# Patient Record
Sex: FEMALE | Race: WHITE | NOT HISPANIC OR LATINO | URBAN - METROPOLITAN AREA
[De-identification: names, ages, dates, MRNs, and addresses within clinical notes are randomized per-mention and may not be internally consistent; named-entity substitution may affect disease eponyms.]

---

## 2017-02-10 ENCOUNTER — EMERGENCY (EMERGENCY)
Facility: HOSPITAL | Age: 67
LOS: 0 days | Discharge: HOME | End: 2017-02-10

## 2017-06-27 DIAGNOSIS — M25.551 PAIN IN RIGHT HIP: ICD-10-CM

## 2017-06-27 DIAGNOSIS — Z88.0 ALLERGY STATUS TO PENICILLIN: ICD-10-CM

## 2017-07-16 ENCOUNTER — EMERGENCY (EMERGENCY)
Facility: HOSPITAL | Age: 67
LOS: 0 days | Discharge: HOME | End: 2017-07-16

## 2017-07-16 DIAGNOSIS — K08.89 OTHER SPECIFIED DISORDERS OF TEETH AND SUPPORTING STRUCTURES: ICD-10-CM

## 2017-07-16 DIAGNOSIS — K04.7 PERIAPICAL ABSCESS WITHOUT SINUS: ICD-10-CM

## 2017-07-16 DIAGNOSIS — Z88.0 ALLERGY STATUS TO PENICILLIN: ICD-10-CM

## 2017-07-16 DIAGNOSIS — Z79.899 OTHER LONG TERM (CURRENT) DRUG THERAPY: ICD-10-CM

## 2018-06-22 ENCOUNTER — EMERGENCY (EMERGENCY)
Facility: HOSPITAL | Age: 68
LOS: 0 days | Discharge: HOME | End: 2018-06-22
Attending: EMERGENCY MEDICINE | Admitting: EMERGENCY MEDICINE
Payer: MEDICARE

## 2018-06-22 VITALS
HEIGHT: 62 IN | DIASTOLIC BLOOD PRESSURE: 98 MMHG | TEMPERATURE: 98 F | OXYGEN SATURATION: 99 % | HEART RATE: 82 BPM | WEIGHT: 149.03 LBS | RESPIRATION RATE: 18 BRPM | SYSTOLIC BLOOD PRESSURE: 155 MMHG

## 2018-06-22 DIAGNOSIS — M79.89 OTHER SPECIFIED SOFT TISSUE DISORDERS: ICD-10-CM

## 2018-06-22 DIAGNOSIS — M71.21 SYNOVIAL CYST OF POPLITEAL SPACE [BAKER], RIGHT KNEE: ICD-10-CM

## 2018-06-22 PROCEDURE — 93970 EXTREMITY STUDY: CPT | Mod: 26

## 2018-06-22 NOTE — ED PROVIDER NOTE - ATTENDING CONTRIBUTION TO CARE
66 yo F presents with c/o swelling to right leg and ankle noted today while getting a pedicure.  Pt has recently started to go to kick boxing class and was hitting the bag with her knee, no SOB, no travel.  On exam pt in NAD AAo x 3, appears well, seen ambulate with staedy gait, + mild swelling to RLE with some calf tenderness, good ROM, no skin changes, + DP pulses equal

## 2018-06-22 NOTE — ED PROVIDER NOTE - OBJECTIVE STATEMENT
right ankle swelling, Started over past week and working out kick boxing, ,Mild pain, No chest pain, no SOB, no back or abdominal pain

## 2018-06-22 NOTE — ED PROVIDER NOTE - MUSCULOSKELETAL MINIMAL EXAM
mild swelling to right ankle with tenderness over right calf, NV intact, FROM, able to wt beat without difficulty

## 2018-06-22 NOTE — ED PROVIDER NOTE - MEDICAL DECISION MAKING DETAILS
Results reviewed and d/w patient and family.  Pt will follow up with PMD, Rec elevate, rest, return if worse

## 2018-06-22 NOTE — ED ADULT TRIAGE NOTE - CHIEF COMPLAINT QUOTE
"I have right leg and ankle pain and swelling." Pt states she started an exercise class two weeks ago.

## 2020-03-04 ENCOUNTER — OUTPATIENT (OUTPATIENT)
Dept: OUTPATIENT SERVICES | Facility: HOSPITAL | Age: 70
LOS: 1 days | Discharge: HOME | End: 2020-03-04

## 2020-03-04 VITALS
HEART RATE: 74 BPM | RESPIRATION RATE: 18 BRPM | WEIGHT: 149.91 LBS | TEMPERATURE: 98 F | HEIGHT: 62 IN | DIASTOLIC BLOOD PRESSURE: 79 MMHG | OXYGEN SATURATION: 98 % | SYSTOLIC BLOOD PRESSURE: 149 MMHG

## 2020-03-04 VITALS — DIASTOLIC BLOOD PRESSURE: 83 MMHG | SYSTOLIC BLOOD PRESSURE: 129 MMHG | HEART RATE: 71 BPM | RESPIRATION RATE: 16 BRPM

## 2020-03-04 NOTE — PRE-ANESTHESIA EVALUATION ADULT - NSANTHOSAYNRD_GEN_A_CORE
No. ABELINO screening performed.  STOP BANG Legend: 0-2 = LOW Risk; 3-4 = INTERMEDIATE Risk; 5-8 = HIGH Risk

## 2020-03-13 DIAGNOSIS — H26.9 UNSPECIFIED CATARACT: ICD-10-CM

## 2020-03-13 DIAGNOSIS — H52.4 PRESBYOPIA: ICD-10-CM

## 2020-03-13 DIAGNOSIS — H52.202 UNSPECIFIED ASTIGMATISM, LEFT EYE: ICD-10-CM

## 2020-03-13 DIAGNOSIS — Z88.0 ALLERGY STATUS TO PENICILLIN: ICD-10-CM

## 2021-01-19 ENCOUNTER — TRANSCRIPTION ENCOUNTER (OUTPATIENT)
Age: 71
End: 2021-01-19

## 2021-02-16 ENCOUNTER — TRANSCRIPTION ENCOUNTER (OUTPATIENT)
Age: 71
End: 2021-02-16

## 2021-03-23 ENCOUNTER — APPOINTMENT (OUTPATIENT)
Dept: DERMATOLOGY | Facility: CLINIC | Age: 71
End: 2021-03-23

## 2021-09-21 ENCOUNTER — TRANSCRIPTION ENCOUNTER (OUTPATIENT)
Age: 71
End: 2021-09-21

## 2021-10-09 ENCOUNTER — FORM ENCOUNTER (OUTPATIENT)
Age: 71
End: 2021-10-09

## 2021-10-10 ENCOUNTER — TRANSCRIPTION ENCOUNTER (OUTPATIENT)
Age: 71
End: 2021-10-10

## 2021-10-10 PROBLEM — Z00.00 ENCOUNTER FOR PREVENTIVE HEALTH EXAMINATION: Status: ACTIVE | Noted: 2021-10-10

## 2021-10-12 ENCOUNTER — APPOINTMENT (OUTPATIENT)
Age: 71
End: 2021-10-12

## 2021-10-12 ENCOUNTER — TRANSCRIPTION ENCOUNTER (OUTPATIENT)
Age: 71
End: 2021-10-12

## 2022-03-11 NOTE — PACU DISCHARGE NOTE - MENTAL STATUS: PATIENT PARTICIPATION
Pending Prescriptions:                       Disp   Refills    ALPRAZolam (XANAX) 0.5 MG tablet           60 tab*0        Sig: Take 1 tablet (0.5 mg) by mouth 2 times daily as           needed for anxiety    Routing refill request to provider for review/approval because:  Drug not on the G refill protocol     Renay Jackson RN         Awake

## 2022-04-26 NOTE — ASU PATIENT PROFILE, ADULT - TRANSFUSION PREMEDICATION REQUIRED
none Methotrexate Counseling:  Patient counseled regarding adverse effects of methotrexate including but not limited to nausea, vomiting, abnormalities in liver function tests. Patients may develop mouth sores, rash, diarrhea, and abnormalities in blood counts. The patient understands that monitoring is required including LFT's and blood counts.  There is a rare possibility of scarring of the liver and lung problems that can occur when taking methotrexate. Persistent nausea, loss of appetite, pale stools, dark urine, cough, and shortness of breath should be reported immediately. Patient advised to discontinue methotrexate treatment at least three months before attempting to become pregnant.  I discussed the need for folate supplements while taking methotrexate.  These supplements can decrease side effects during methotrexate treatment. The patient verbalized understanding of the proper use and possible adverse effects of methotrexate.  All of the patient's questions and concerns were addressed.

## 2022-11-03 NOTE — ED PROVIDER NOTE - RESPIRATORY NEGATIVE STATEMENT, MLM
Chief Complaint       Cough (Symptoms started on Tuesday. She had one dose of mucinex. ), Nasal Congestion, Pharyngitis, and Laryngitis    History of Present Illness   Source of history provided by:  patient. Zita Acosta is a 49 y.o. old female presenting to the walk in clinic for evaluation of nasal congestion, nasal drainage, bilateral mild ear pressure, mild non-productive cough and sore throat x 4 days. Has been taking mucinex DM OTC without relief. Denies any fever, chills, wheezing, CP, SOB, or GI symptoms. Patient denies any hx of asthma or COPD. Pt is former smoker. Patient denies any contact with any individuals with known COVID-19 infection or under investigation for COVID-19 infection. Pt has been vaccinated for COVID-19. Pt was seen by PCP on 11/1 for symptoms. ROS    Unless otherwise stated in this report or unable to obtain because of the patient's clinical or mental status as evidenced by the medical record, this patients's positive and negative responses for Review of Systems, constitutional, psych, eyes, ENT, cardiovascular, respiratory, gastrointestinal, neurological, genitourinary, musculoskeletal, integument systems and systems related to the presenting problem are either stated in the preceding or were not pertinent or were negative for the symptoms and/or complaints related to the medical problem. Physical Exam         VS:  /80   Pulse 92   Temp 97.9 °F (36.6 °C) (Temporal)   Resp 16   Ht 5' 4\" (1.626 m)   Wt 241 lb (109.3 kg)   LMP 11/03/2022 (Exact Date)   SpO2 99%   BMI 41.37 kg/m²    Oxygen Saturation Interpretation: Normal.    Constitutional:  Alert, development consistent with age. Ears:  External Ears: Bilateral pinna normal. TMs translucent without erythema or perforation bilaterally. Canals normal bilaterally without swelling or exudate  Nose:  Mild congestion of the nasal mucosa. There is no injection to middle turbinates bilaterally.    Throat: Mild posterior pharyngeal erythema with mild post nasal drip present. No exudate or tonsillar hypertrophy noted. Neck:  Supple. There is no anterior cervical adenopathy. Lungs: CTAB without wheezes, rales, or rhonchi  Heart:  Regular rate and rhythm, normal heart sounds, without pathological murmurs, ectopy, gallops, or rubs. Skin:  Normal turgor. Warm, dry, without visible rash. Neurological:  Alert and oriented. Motor functions intact. Responds to verbal commands. Lab / Imaging Results   (All laboratory and radiology results have been personally reviewed by myself)  Labs:  Results for orders placed or performed in visit on 11/03/22   POCT COVID-19, Antigen   Result Value Ref Range    SARS-COV-2, POC Not-Detected Not Detected    Lot Number [id number]     QC Pass/Fail pass     Performing Instrument BD Veritor    POCT rapid strep A   Result Value Ref Range    Strep A Ag None Detected None Detected       Imaging: All Radiology results interpreted by Radiologist unless otherwise noted. Assessment / Plan     Impression(s):  Nicolle Grewal was seen today for cough, nasal congestion, pharyngitis and laryngitis. Diagnoses and all orders for this visit:    URI with cough and congestion  -     POCT COVID-19, Antigen  -     POCT rapid strep A  -     methylPREDNISolone (MEDROL DOSEPACK) 4 MG tablet; Take by mouth.  -     azithromycin (ZITHROMAX) 250 MG tablet; Take 2 tablets by mouth on day 1, Take 1 tablet by mouth on days 2-5    Disposition:  Disposition: Discharge to home. Increase fluids and rest. Script written for dose pack and azithromycin, side effects discussed. Additional symptomatic relief discussed. PCP in 5-7 days if symptoms persist. ED sooner if symptoms worsen or change. Red flag symptoms discussed. Pt is in agreement with this care plan. All questions answered. CHRISTIE Velasco    **This report was transcribed using voice recognition software.  Every effort was made to ensure accuracy; however, inadvertent computerized transcription errors may be present. no chest pain, no cough, and no shortness of breath.

## 2022-11-26 ENCOUNTER — INPATIENT (INPATIENT)
Facility: HOSPITAL | Age: 72
LOS: 2 days | Discharge: HOME HEALTH | End: 2022-11-29
Attending: STUDENT IN AN ORGANIZED HEALTH CARE EDUCATION/TRAINING PROGRAM | Admitting: STUDENT IN AN ORGANIZED HEALTH CARE EDUCATION/TRAINING PROGRAM

## 2022-11-26 VITALS
OXYGEN SATURATION: 100 % | TEMPERATURE: 100 F | HEART RATE: 108 BPM | SYSTOLIC BLOOD PRESSURE: 136 MMHG | WEIGHT: 139.99 LBS | RESPIRATION RATE: 18 BRPM | DIASTOLIC BLOOD PRESSURE: 83 MMHG

## 2022-11-26 LAB
A1C WITH ESTIMATED AVERAGE GLUCOSE RESULT: 5.3 % — SIGNIFICANT CHANGE UP (ref 4–5.6)
ALBUMIN SERPL ELPH-MCNC: 4.1 G/DL — SIGNIFICANT CHANGE UP (ref 3.5–5.2)
ALP SERPL-CCNC: 88 U/L — SIGNIFICANT CHANGE UP (ref 30–115)
ALT FLD-CCNC: 45 U/L — HIGH (ref 0–41)
ANION GAP SERPL CALC-SCNC: 10 MMOL/L — SIGNIFICANT CHANGE UP (ref 7–14)
AST SERPL-CCNC: 37 U/L — SIGNIFICANT CHANGE UP (ref 0–41)
BASOPHILS # BLD AUTO: 0.03 K/UL — SIGNIFICANT CHANGE UP (ref 0–0.2)
BASOPHILS NFR BLD AUTO: 0.4 % — SIGNIFICANT CHANGE UP (ref 0–1)
BILIRUB SERPL-MCNC: 0.6 MG/DL — SIGNIFICANT CHANGE UP (ref 0.2–1.2)
BUN SERPL-MCNC: 12 MG/DL — SIGNIFICANT CHANGE UP (ref 10–20)
CALCIUM SERPL-MCNC: 9.2 MG/DL — SIGNIFICANT CHANGE UP (ref 8.4–10.5)
CHLORIDE SERPL-SCNC: 102 MMOL/L — SIGNIFICANT CHANGE UP (ref 98–110)
CHOLEST SERPL-MCNC: 140 MG/DL — SIGNIFICANT CHANGE UP
CO2 SERPL-SCNC: 26 MMOL/L — SIGNIFICANT CHANGE UP (ref 17–32)
CREAT SERPL-MCNC: 0.6 MG/DL — LOW (ref 0.7–1.5)
CRP SERPL-MCNC: 194.7 MG/L — HIGH
D DIMER BLD IA.RAPID-MCNC: 386 NG/ML DDU — HIGH (ref 0–230)
EGFR: 95 ML/MIN/1.73M2 — SIGNIFICANT CHANGE UP
EOSINOPHIL # BLD AUTO: 0.04 K/UL — SIGNIFICANT CHANGE UP (ref 0–0.7)
EOSINOPHIL NFR BLD AUTO: 0.6 % — SIGNIFICANT CHANGE UP (ref 0–8)
ERYTHROCYTE [SEDIMENTATION RATE] IN BLOOD: 90 MM/HR — HIGH (ref 0–20)
ESTIMATED AVERAGE GLUCOSE: 105 MG/DL — SIGNIFICANT CHANGE UP (ref 68–114)
FLUAV AG NPH QL: SIGNIFICANT CHANGE UP
FLUBV AG NPH QL: SIGNIFICANT CHANGE UP
GLUCOSE SERPL-MCNC: 119 MG/DL — HIGH (ref 70–99)
HCT VFR BLD CALC: 38.6 % — SIGNIFICANT CHANGE UP (ref 37–47)
HDLC SERPL-MCNC: 54 MG/DL — SIGNIFICANT CHANGE UP
HGB BLD-MCNC: 13 G/DL — SIGNIFICANT CHANGE UP (ref 12–16)
IMM GRANULOCYTES NFR BLD AUTO: 0.3 % — SIGNIFICANT CHANGE UP (ref 0.1–0.3)
LIPID PNL WITH DIRECT LDL SERPL: 71 MG/DL — SIGNIFICANT CHANGE UP
LYMPHOCYTES # BLD AUTO: 0.81 K/UL — LOW (ref 1.2–3.4)
LYMPHOCYTES # BLD AUTO: 11.3 % — LOW (ref 20.5–51.1)
MAGNESIUM SERPL-MCNC: 2 MG/DL — SIGNIFICANT CHANGE UP (ref 1.8–2.4)
MCHC RBC-ENTMCNC: 28.8 PG — SIGNIFICANT CHANGE UP (ref 27–31)
MCHC RBC-ENTMCNC: 33.7 G/DL — SIGNIFICANT CHANGE UP (ref 32–37)
MCV RBC AUTO: 85.6 FL — SIGNIFICANT CHANGE UP (ref 81–99)
MONOCYTES # BLD AUTO: 0.81 K/UL — HIGH (ref 0.1–0.6)
MONOCYTES NFR BLD AUTO: 11.3 % — HIGH (ref 1.7–9.3)
NEUTROPHILS # BLD AUTO: 5.43 K/UL — SIGNIFICANT CHANGE UP (ref 1.4–6.5)
NEUTROPHILS NFR BLD AUTO: 76.1 % — HIGH (ref 42.2–75.2)
NON HDL CHOLESTEROL: 86 MG/DL — SIGNIFICANT CHANGE UP
NRBC # BLD: 0 /100 WBCS — SIGNIFICANT CHANGE UP (ref 0–0)
NT-PROBNP SERPL-SCNC: 882 PG/ML — HIGH (ref 0–300)
PLATELET # BLD AUTO: 219 K/UL — SIGNIFICANT CHANGE UP (ref 130–400)
POTASSIUM SERPL-MCNC: 3.9 MMOL/L — SIGNIFICANT CHANGE UP (ref 3.5–5)
POTASSIUM SERPL-SCNC: 3.9 MMOL/L — SIGNIFICANT CHANGE UP (ref 3.5–5)
PROT SERPL-MCNC: 6.7 G/DL — SIGNIFICANT CHANGE UP (ref 6–8)
RBC # BLD: 4.51 M/UL — SIGNIFICANT CHANGE UP (ref 4.2–5.4)
RBC # FLD: 12.4 % — SIGNIFICANT CHANGE UP (ref 11.5–14.5)
RSV RNA NPH QL NAA+NON-PROBE: DETECTED
SARS-COV-2 RNA SPEC QL NAA+PROBE: SIGNIFICANT CHANGE UP
SODIUM SERPL-SCNC: 138 MMOL/L — SIGNIFICANT CHANGE UP (ref 135–146)
TRIGL SERPL-MCNC: 76 MG/DL — SIGNIFICANT CHANGE UP
TROPONIN T SERPL-MCNC: <0.01 NG/ML — SIGNIFICANT CHANGE UP
TROPONIN T SERPL-MCNC: <0.01 NG/ML — SIGNIFICANT CHANGE UP
TSH SERPL-MCNC: 0.68 UIU/ML — SIGNIFICANT CHANGE UP (ref 0.27–4.2)
WBC # BLD: 7.14 K/UL — SIGNIFICANT CHANGE UP (ref 4.8–10.8)
WBC # FLD AUTO: 7.14 K/UL — SIGNIFICANT CHANGE UP (ref 4.8–10.8)

## 2022-11-26 PROCEDURE — 93010 ELECTROCARDIOGRAM REPORT: CPT | Mod: 77

## 2022-11-26 PROCEDURE — 93306 TTE W/DOPPLER COMPLETE: CPT | Mod: 26

## 2022-11-26 PROCEDURE — 71275 CT ANGIOGRAPHY CHEST: CPT | Mod: 26

## 2022-11-26 PROCEDURE — 93010 ELECTROCARDIOGRAM REPORT: CPT | Mod: 77,76

## 2022-11-26 PROCEDURE — 99222 1ST HOSP IP/OBS MODERATE 55: CPT

## 2022-11-26 PROCEDURE — 99223 1ST HOSP IP/OBS HIGH 75: CPT

## 2022-11-26 PROCEDURE — 99285 EMERGENCY DEPT VISIT HI MDM: CPT | Mod: FS

## 2022-11-26 PROCEDURE — 93970 EXTREMITY STUDY: CPT | Mod: 26

## 2022-11-26 PROCEDURE — 71045 X-RAY EXAM CHEST 1 VIEW: CPT | Mod: 26

## 2022-11-26 RX ORDER — COLCHICINE 0.6 MG
0.6 TABLET ORAL ONCE
Refills: 0 | Status: COMPLETED | OUTPATIENT
Start: 2022-11-26 | End: 2022-11-26

## 2022-11-26 RX ORDER — METOPROLOL TARTRATE 50 MG
25 TABLET ORAL EVERY 8 HOURS
Refills: 0 | Status: DISCONTINUED | OUTPATIENT
Start: 2022-11-26 | End: 2022-11-27

## 2022-11-26 RX ORDER — ACETAMINOPHEN 500 MG
650 TABLET ORAL EVERY 6 HOURS
Refills: 0 | Status: DISCONTINUED | OUTPATIENT
Start: 2022-11-26 | End: 2022-11-29

## 2022-11-26 RX ORDER — ENOXAPARIN SODIUM 100 MG/ML
40 INJECTION SUBCUTANEOUS EVERY 24 HOURS
Refills: 0 | Status: DISCONTINUED | OUTPATIENT
Start: 2022-11-26 | End: 2022-11-29

## 2022-11-26 RX ORDER — IBUPROFEN 200 MG
600 TABLET ORAL ONCE
Refills: 0 | Status: COMPLETED | OUTPATIENT
Start: 2022-11-26 | End: 2022-11-26

## 2022-11-26 RX ORDER — ASPIRIN/CALCIUM CARB/MAGNESIUM 324 MG
324 TABLET ORAL ONCE
Refills: 0 | Status: COMPLETED | OUTPATIENT
Start: 2022-11-26 | End: 2022-11-26

## 2022-11-26 RX ORDER — COLCHICINE 0.6 MG
0.6 TABLET ORAL DAILY
Refills: 0 | Status: DISCONTINUED | OUTPATIENT
Start: 2022-11-27 | End: 2022-11-27

## 2022-11-26 RX ORDER — IBUPROFEN 200 MG
600 TABLET ORAL DAILY
Refills: 0 | Status: DISCONTINUED | OUTPATIENT
Start: 2022-11-27 | End: 2022-11-27

## 2022-11-26 RX ORDER — ACETAMINOPHEN 500 MG
650 TABLET ORAL ONCE
Refills: 0 | Status: COMPLETED | OUTPATIENT
Start: 2022-11-26 | End: 2022-11-26

## 2022-11-26 RX ADMIN — Medication 600 MILLIGRAM(S): at 15:03

## 2022-11-26 RX ADMIN — Medication 650 MILLIGRAM(S): at 14:09

## 2022-11-26 RX ADMIN — Medication 650 MILLIGRAM(S): at 14:39

## 2022-11-26 RX ADMIN — ENOXAPARIN SODIUM 40 MILLIGRAM(S): 100 INJECTION SUBCUTANEOUS at 14:09

## 2022-11-26 RX ADMIN — Medication 0.6 MILLIGRAM(S): at 12:01

## 2022-11-26 RX ADMIN — Medication 650 MILLIGRAM(S): at 22:39

## 2022-11-26 RX ADMIN — Medication 25 MILLIGRAM(S): at 21:45

## 2022-11-26 RX ADMIN — Medication 0.6 MILLIGRAM(S): at 21:44

## 2022-11-26 RX ADMIN — Medication 324 MILLIGRAM(S): at 09:38

## 2022-11-26 RX ADMIN — Medication 600 MILLIGRAM(S): at 15:33

## 2022-11-26 RX ADMIN — Medication 650 MILLIGRAM(S): at 22:09

## 2022-11-26 RX ADMIN — Medication 25 MILLIGRAM(S): at 14:09

## 2022-11-26 NOTE — ED PROVIDER NOTE - NS ED ROS FT
Constitutional: (-) fever  Eyes/ENT: (-) blurry vision, (-) epistaxis  Cardiovascular: (+) chest pain, (-) syncope  Respiratory: (+) cough, (-) shortness of breath  Gastrointestinal: (-) vomiting, (-) diarrhea  Musculoskeletal: (-) neck pain, (-) back pain, (-) joint pain  Integumentary: (-) rash, (-) edema  Neurological: (-) headache, (-) altered mental status  Psychiatric: (-) hallucinations  Allergic/Immunologic: (-) pruritus

## 2022-11-26 NOTE — CHART NOTE - NSCHARTNOTEFT_GEN_A_CORE
I responded to code stemi.     72 yr old female presenting with chest pain since last night.   EKG: reviewed  Code STEMI Cancelled.   Patient will be transferred to [place] for further evaluation   Case discussed with attending. I responded to code stemi.  EKG: reviewed  Code STEMI Cancelled.   Patient will be transferred to [place] for further evaluation   Case discussed with attending.    Update:  Patient was seen and examined at bedside at [facility] with attending.     72 yr old female with no significant medical hx presenting with chest pain since last night. Pt states having cough for past 1 week associated with runny nose and one episode of 100.5 temp yesterday. Patinet started to have chest pain since last night, centrally placed radiating to the back, worse with deep breaths. Patient denies any cardiac hx, no prior chest pain.     # Acute post viral Pericarditis  # Suspected New onset Paroxsymal Afib   - hemodynamically stable  - EKG: NSR with no ischemia   - tele had brief period of afib --> then converted to NSR   - start colchicine 0.6 mg every 12 hours today --> then 0.6 mg once daily  - trend trop   - check echo   - check RVP/COVID  - check esr, crp  - check TSH  - tele monitoring   - admit to 4T I responded to code stemi.    EKG: reviewed  Code STEMI Cancelled.   Patient will be transferred to [place] for further evaluation   Case discussed with attending.    Update:  Patient was seen and examined at bedside at [facility] with attending.     72 yr old female with no significant medical hx presenting with chest pain since last night. Pt states having cough for past 1 week associated with runny nose and one episode of 100.5 temp yesterday. Patinet started to have chest pain since last night, centrally placed radiating to the back, worse with deep breaths. Patient denies any cardiac hx, no prior chest pain.     # Acute post viral Pericarditis  # Suspected New onset Paroxsymal Afib   - hemodynamically stable  - EKG: NSR with no ischemia   - tele had brief period of afib --> then converted to NSR   - start colchicine 0.6 mg every 12 hours today --> then 0.6 mg once daily  - trend trop   - check echo   - check RVP/COVID  - check esr, crp  - check TSH  - tele monitoring   - admit to 4T  - please refer to my consult note from today

## 2022-11-26 NOTE — CONSULT NOTE ADULT - ASSESSMENT
Patient seen and examined with the cardiology fellow. Discussed with the family at bedside.    71 y/o lady with no prior cardiac history, presenting with a few days of viral illness, low grade fevers, cough, now with pleuritic chest pain.  ECG was found to have ST elevations and a STEMI Code was called, which I have cancelled.    ECG and presentation is most consistent with acute pericarditis, likely viral.  F/u COVID-19, influenza and RSV screen. Respiratory panel.  Start Colchicine for 3 months and brief course of NSAID - indomethacin or ibuprofen for 7-10 days. H2 blocker fro GI protection.  2D echo to assess for pericardial effusion, r/o wall motion abnormalities.  Repeat troponin in 8 hours. Check ESR, CRP.  Cancel cath.  Possible episode of AF on tele - not recorded. Possibly due to pericardial irritation, monitor on telemetry for 24 hours. Would hold off on anticoagulation for now.  Admit to cardiac telemetry. Recall for any ischemic issues.

## 2022-11-26 NOTE — ED PROVIDER NOTE - PROGRESS NOTE DETAILS
Spoke ot Dr. Flores, [facility] cardiology fellow. EKG sent via teams. D/w Dr. Elliott aware of ER transfer Family appraised of situation. EMS at bedside. Aspirin given and chest x-ray done. CP: patient arrived from [facility]. stable. cardio fellow notified. obtaining repeat ekg CP: cardiology fellow and attending at bedside 72-year-old female presenting for chest pain.  Patient initially a STEMI however cardiology believes this to be myopericarditis would like patient admitted to cards telemetry for further evaluation and management no acute intervention at this time.

## 2022-11-26 NOTE — PATIENT PROFILE ADULT - FALL HARM RISK - HARM RISK INTERVENTIONS

## 2022-11-26 NOTE — ED ADULT NURSE REASSESSMENT NOTE - NS ED NURSE REASSESS COMMENT FT1
report given to ED Charge CANDIE Guerrero. as per MD Cruz patient ED to ED
Received pt from [facility]. Patient in critical care area of ED. Patient A&Ox4. Patient placed on tele o2 monitor. VSS. Cardiology at bedside. EKG done as per orders. Family at bedside.  Comfort measures in place. Will follow up.

## 2022-11-26 NOTE — ED PROVIDER NOTE - CHIEF COMPLAINT
The patient is a 72y Female complaining of cough. The patient is a 72y Female complaining of chest pain.

## 2022-11-26 NOTE — PATIENT PROFILE ADULT - IS PATIENT POST-MENOPAUSAL?
yes Ear Star Wedge Flap Text: The defect edges were debeveled with a #15 blade scalpel.  Given the location of the defect and the proximity to free margins (helical rim) an ear star wedge flap was deemed most appropriate.  Using a sterile surgical marker, the appropriate flap was drawn incorporating the defect and placing the expected incisions between the helical rim and antihelix where possible.  The area thus outlined was incised through and through with a #15 scalpel blade.

## 2022-11-26 NOTE — H&P ADULT - ASSESSMENT
Plan  72 yr old female with no significant medical hx presenting with chest pain  radiating to the back, since last night associated with recent viral illness with a temp of 100.5 with rhinorrhea and cough along with an episode of afib in the ED. Patient admitted to  for further management of pericarditis.     Assessment:   # Acute post viral Pericarditis  - hemodynamically stable  - EKG: NSR with no ischemia  - start colchicine 0.6 mg every 12 hours today --> then 0.6 mg once daily  - trend trop  - check echo  - check RVP/COVID  - check esr, crp  - check TSH, Free T4  - tele monitoring    # Suspected New onset Paroxsymal Afib  - tele had brief period of afib --> then converted to NSR  - Monitor on tele     Please contact me with any questions or concerns at [phone]. Plan  72 yr old female with no significant medical hx presenting with chest pain  radiating to the back, since last night associated with recent viral illness with a temp of 100.5 with rhinorrhea and cough along with an episode of afib in the ED. Patient admitted to  for further management of pericarditis.     Assessment:   # Acute post viral Pericarditis  - hemodynamically stable  - EKG: NSR with no ischemia  - start colchicine 0.6 mg every 12 hours today --> then 0.6 mg once daily  - trend trop  - check echo  - check RVP/COVID, blood cultures  - check esr, crp  - check TSH, Free T4  - tele monitoring    # Suspected New onset Paroxsymal Afib  - tele had brief period of afib --> then converted to NSR  - Monitor on tele     Please contact me with any questions or concerns at [phone].

## 2022-11-26 NOTE — H&P ADULT - NSHPLABSRESULTS_GEN_ALL_CORE
Data reviewed:  - Telemetry:    - ECG (date 11/26/22):    Ventricular Rate 106 BPM    Atrial Rate 106 BPM    P-R Interval 134 ms    QRS Duration 78 ms    Q-T Interval 344 ms    QTC Calculation(Bazett) 456 ms    P Axis 11 degrees    R Axis 109 degrees    T Axis 42 degrees    Diagnosis Line Sinus tachycardia  Rightward axis  Low voltage QRS  ST elevation, consider early repolarization, pericarditis, or injury  Abnormal ECG    Confirmed by Jase Ewing ([id number]) on 11/26/2022 11:37:49 AM    - Chest x-ray (date 11/26/22):  Impression:  No radiographic evidence of acute cardiopulmonary disease.   `  -No prior TTE, Stress test, CCTA, Cardiac catheterization, Cardiac MRI:    - Labs:                        13.0   7.14  )-----------( 219      ( 26 Nov 2022 08:37 )             38.6     11-26    138  |  102  |  12  ----------------------------<  119<H>  3.9   |  26  |  0.6<L>    Ca    9.2      26 Nov 2022 08:37  Mg     2.0     11-26    TPro  6.7  /  Alb  4.1  /  TBili  0.6  /  DBili  x   /  AST  37  /  ALT  45<H>  /  AlkPhos  88  11-26      Troponin T, Serum: <0.01 ng/mL (11-26-22 @ 08:37)    Serum Pro-Brain Natriuretic Peptide: 882 pg/mL (11-26)

## 2022-11-26 NOTE — ED PROVIDER NOTE - OBJECTIVE STATEMENT
72 year old female no sig past medical history comes to emergency room for chest pain. patient states that she has been sick for the last week with cough and ear pain and was seen by out patient urgent care center and told had infection and placed on zpak. patient states that since last night she is having chest tightness central located and going to left shoulder at times worse with deep breath. no shortness of breath. no leg pain or swelling. no fever/chills.

## 2022-11-26 NOTE — H&P ADULT - HISTORY OF PRESENT ILLNESS
Patient is a 72y old  female who presents with a chief complaint of intermittent midsternal chest pain radiating to the right scapula for 1 night while she was laying in bed last night. She states that the chest pain is worse when she lays down flat and takes a deep breath and alleviated when she sit forward. Patient endorses recent viral illness and had an episode of a temp of 100.5 yesterday with a runny nose and non productive cough. Patient denies taking any medications to alleviate the chest pain or the fever.     Patient was a code STEMI [facility] and was subsequently cancelled once repeat EKG was done showing diffuse ST elevation. Patient also had an episode of atrial fibrillation in the ED and reverted back to SR spontaneously. Tropx1 negative    No significant medical history or family history.  Patient is a 72y old  female who presents with a chief complaint of intermittent midsternal chest pain radiating to the right scapula for 1 night while she was laying in bed last night. She states that the chest pain is worse when she lays down flat and takes a deep breath and alleviated when she sit forward. Patient endorses recent viral illness and had an episode of a temp of 100.5 yesterday with a runny nose and non productive cough. Patient denies taking any medications to alleviate the chest pain or the fever.     Patient was a code STEMI [facility] and was subsequently cancelled once repeat EKG was done showing diffuse ST elevation. Patient also had an episode of atrial fibrillation in the ED and reverted back to SR spontaneously. Tropx1 negative. Given ASA 81 at [facility]     No significant medical history or family history.

## 2022-11-26 NOTE — ED PROVIDER NOTE - ATTENDING APP SHARED VISIT CONTRIBUTION OF CARE
Pt reports chest pain which occurred since last night. URI symptoms with cough since this past Sunday. Seen in urgent care and found to have a ear infection for which she received Zithromax x 3 days. No medical problems. Mild dyspnea on exertion. S1S2 rrr, no murmur, + mild right costochondral tenderness, No JVD, lung clear, abdomen is soft nontender, ext neg for edema , DP 2+ bilaterally.

## 2022-11-26 NOTE — ED PROVIDER NOTE - CLINICAL SUMMARY MEDICAL DECISION MAKING FREE TEXT BOX
PT presents with chest pain. Abnormal EKG in the inferior leads. Stemi code called. Cardiology advised transfer to [place] for evaluation. EMS arrived to take to [place] ED. Treated with Aspirin as per cardiology.

## 2022-11-26 NOTE — H&P ADULT - TIME BILLING
Interviewed and examined patient. Reviewed history, hospital course, ECG, CXR, tele, lab work, and medications. Discussed plan with patient and family member.

## 2022-11-26 NOTE — H&P ADULT - NS ATTEND AMEND GEN_ALL_CORE FT
Agree with assessment and plan, as I have documented above. Ms Najera s a 73yo F with no significant PMHx who is presenting with RSV URI complicated by AF with RVR and possible pericarditis. Will order TTE, ESR, and CRP to evaluate for pericarditis, while starting colchicine and ibuprofen. Will also start metoprolol for AF. Her CHADSVASC=2 (age, gender), thus does not meet criteria for AC (based on new AF guidelines regarding gender). However, will check TTE and A1c to confirm. Patient requires tele monitoring due to high risk arrhythmias. Coordinated care with interventional cardiology.

## 2022-11-26 NOTE — ED PROVIDER NOTE - CARE PLAN
1 Principal Discharge DX:	ST elevation MI (STEMI)  Secondary Diagnosis:	Chest pain  Secondary Diagnosis:	Cough   Principal Discharge DX:	Pericarditis  Secondary Diagnosis:	Chest pain  Secondary Diagnosis:	Cough   Principal Discharge DX:	Pericarditis  Secondary Diagnosis:	Chest pain  Secondary Diagnosis:	Cough  Secondary Diagnosis:	Respiratory syncytial virus (RSV)

## 2022-11-26 NOTE — H&P ADULT - NSHPPHYSICALEXAM_GEN_ALL_CORE
Vitals:  T(F): 99.7, Max: 99.7 (11-26 @ 08:30)  HR: 102 (102 - 108)  BP: 159/86 (136/83 - 159/86)  RR: 18 (18 - 18)  SpO2: 100% (100% - 100%)    Ins & outs:     Weight trend:  Weight (kg): 63.5 (11-26)    Physical exam:  General: No apparent distress  HEENT: Anicteric sclera. Moist mucous membranes. JVD -  Cardiac: Regular rate and rhythm. No murmurs, rubs, or gallops.   Vascular: Symmetric radial pulses. Dorsalis pedis pulses palpable.   Respiratory: Normal effort. bilateral crackles on the upper lung fields. No wheezes.   Abdomen: Soft, nontender. Audible bowel sounds.   Extremities: Warm with - edema. No cyanosis or clubbing.   Skin: Warm and dry. No rash.   Neurologic: Grossly normal motor function.   Psychiatric: Oriented to person, place, and time.

## 2022-11-26 NOTE — CONSULT NOTE ADULT - SUBJECTIVE AND OBJECTIVE BOX
Patient is a 72y old  Female who presents with a chief complaint of Chest pain (2022 11:29)      HPI:  Patient is a 72y old  female who presents with a chief complaint of intermittent midsternal chest pain radiating to the right scapula for 1 night while she was laying in bed last night. She states that the chest pain is worse when she lays down flat and takes a deep breath and alleviated when she sit forward. Patient endorses recent viral illness and had an episode of a temp of 100.5 yesterday with a runny nose and non productive cough. Patient denies taking any medications to alleviate the chest pain or the fever.     Patient was a code STEMI [facility] and was subsequently cancelled once repeat EKG was done showing diffuse ST elevation. Patient also had an episode of atrial fibrillation in the ED and reverted back to SR spontaneously. Tropx1 negative. Given ASA 81 at [facility]     No significant medical history or family history.  (2022 11:29)      PAST MEDICAL & SURGICAL HISTORY:  No pertinent past medical history       delivery delivered  x 3          PREVIOUS DIAGNOSTIC TESTING:      ECHO  FINDINGS:  Pending          MEDICATIONS  (STANDING):  acetaminophen     Tablet .. 650 milliGRAM(s) Oral once  colchicine 0.6 milliGRAM(s) Oral once    MEDICATIONS  (PRN):  acetaminophen     Tablet .. 650 milliGRAM(s) Oral every 6 hours PRN Temp greater or equal to 38C (100.4F), Mild Pain (1 - 3), Moderate Pain (4 - 6)      FAMILY HISTORY:  No pertinent family history in first degree relatives        SOCIAL HISTORY:  CIGARETTES:  Non-smoker        REVIEW OF SYSTEMS:  As per HPI, otherwise negative.        Vital Signs Last 24 Hrs  T(C): 37.8 (2022 11:28), Max: 37.8 (2022 11:28)  T(F): 100.1 (2022 11:28), Max: 100.1 (2022 11:28)  HR: 103 (:28) (102 - 108)  BP: 147/86 (2022 11:28) (136/83 - 159/86)  BP(mean): 110 (2022 11:28) (110 - 110)  RR: 20 (:28) (18 - 20)  SpO2: 97% (2022 11:28) (97% - 100%)    Parameters below as of 2022 11:28  Patient On (Oxygen Delivery Method): room air            PHYSICAL EXAM:  GENERAL: NAD, AAO x 3  HEAD:  Atraumatic, Normocephalic  EYES: EOMI, PERRL, conjunctiva and sclera clear  ENMT: Moist mucous membranes  NECK: Supple, No JVD, No bruits  NERVOUS SYSTEM:  Alert & Oriented X3, Good concentration; No focal deficits  CHEST/LUNG: Clear bilaterally; No rales, rhonchi, wheezing, or rubs  HEART: Regular rate and rhythm; Normal S1-S2, No murmurs, rubs, or gallops  ABDOMEN: Soft, Nontender, Nondistended; Bowel sounds present  EXTREMITIES:   No clubbing, cyanosis, or edema  SKIN: No rashes or lesions        ECG:    < from: 12 Lead ECG (22 @ 10:19) >  Sinus tachycardia  Rightward axis  Low voltage QRS  ST elevation, consider early repolarization, pericarditis, or injury  Abnormal ECG    < end of copied text >      I&O's Detail    2022 07:01  -  2022 12:40  --------------------------------------------------------  IN:    Oral Fluid: 60 mL  Total IN: 60 mL    OUT:    Voided (mL): 200 mL  Total OUT: 200 mL    Total NET: -140 mL          LABS:                        13.0   7.14  )-----------( 219      ( 2022 08:37 )             38.6         138  |  102  |  12  ----------------------------<  119<H>  3.9   |  26  |  0.6<L>    Ca    9.2      2022 08:37  Mg     2.0         TPro  6.7  /  Alb  4.1  /  TBili  0.6  /  DBili  x   /  AST  37  /  ALT  45<H>  /  AlkPhos  88      CARDIAC MARKERS ( 2022 08:37 )  x     / <0.01 ng/mL / x     / x     / x              I&O's Summary    2022 07:01  -  2022 12:40  --------------------------------------------------------  IN: 60 mL / OUT: 200 mL / NET: -140 mL          RADIOLOGY & ADDITIONAL STUDIES:

## 2022-11-26 NOTE — ED PROVIDER NOTE - NS ED ATTENDING STATEMENT MOD
This was a shared visit with the HELENE. I reviewed and verified the documentation and independently performed the documented:

## 2022-11-27 LAB
ANION GAP SERPL CALC-SCNC: 12 MMOL/L — SIGNIFICANT CHANGE UP (ref 7–14)
BUN SERPL-MCNC: 8 MG/DL — LOW (ref 10–20)
CALCIUM SERPL-MCNC: 8.8 MG/DL — SIGNIFICANT CHANGE UP (ref 8.4–10.5)
CHLORIDE SERPL-SCNC: 104 MMOL/L — SIGNIFICANT CHANGE UP (ref 98–110)
CO2 SERPL-SCNC: 26 MMOL/L — SIGNIFICANT CHANGE UP (ref 17–32)
CREAT SERPL-MCNC: 0.5 MG/DL — LOW (ref 0.7–1.5)
EGFR: 99 ML/MIN/1.73M2 — SIGNIFICANT CHANGE UP
GLUCOSE SERPL-MCNC: 112 MG/DL — HIGH (ref 70–99)
HCT VFR BLD CALC: 36.5 % — LOW (ref 37–47)
HGB BLD-MCNC: 12.7 G/DL — SIGNIFICANT CHANGE UP (ref 12–16)
MAGNESIUM SERPL-MCNC: 2.2 MG/DL — SIGNIFICANT CHANGE UP (ref 1.8–2.4)
MCHC RBC-ENTMCNC: 29.7 PG — SIGNIFICANT CHANGE UP (ref 27–31)
MCHC RBC-ENTMCNC: 34.8 G/DL — SIGNIFICANT CHANGE UP (ref 32–37)
MCV RBC AUTO: 85.3 FL — SIGNIFICANT CHANGE UP (ref 81–99)
NRBC # BLD: 0 /100 WBCS — SIGNIFICANT CHANGE UP (ref 0–0)
PLATELET # BLD AUTO: 255 K/UL — SIGNIFICANT CHANGE UP (ref 130–400)
POTASSIUM SERPL-MCNC: 4.2 MMOL/L — SIGNIFICANT CHANGE UP (ref 3.5–5)
POTASSIUM SERPL-SCNC: 4.2 MMOL/L — SIGNIFICANT CHANGE UP (ref 3.5–5)
PROCALCITONIN SERPL-MCNC: 0.16 NG/ML — HIGH (ref 0.02–0.1)
RBC # BLD: 4.28 M/UL — SIGNIFICANT CHANGE UP (ref 4.2–5.4)
RBC # FLD: 12.6 % — SIGNIFICANT CHANGE UP (ref 11.5–14.5)
SODIUM SERPL-SCNC: 142 MMOL/L — SIGNIFICANT CHANGE UP (ref 135–146)
WBC # BLD: 7.63 K/UL — SIGNIFICANT CHANGE UP (ref 4.8–10.8)
WBC # FLD AUTO: 7.63 K/UL — SIGNIFICANT CHANGE UP (ref 4.8–10.8)

## 2022-11-27 PROCEDURE — 93010 ELECTROCARDIOGRAM REPORT: CPT

## 2022-11-27 PROCEDURE — 99233 SBSQ HOSP IP/OBS HIGH 50: CPT

## 2022-11-27 RX ORDER — METOPROLOL TARTRATE 50 MG
50 TABLET ORAL EVERY 6 HOURS
Refills: 0 | Status: DISCONTINUED | OUTPATIENT
Start: 2022-11-27 | End: 2022-11-29

## 2022-11-27 RX ORDER — COLCHICINE 0.6 MG
0.6 TABLET ORAL
Refills: 0 | Status: DISCONTINUED | OUTPATIENT
Start: 2022-11-27 | End: 2022-11-29

## 2022-11-27 RX ORDER — METOPROLOL TARTRATE 50 MG
25 TABLET ORAL ONCE
Refills: 0 | Status: COMPLETED | OUTPATIENT
Start: 2022-11-27 | End: 2022-11-27

## 2022-11-27 RX ORDER — IBUPROFEN 200 MG
600 TABLET ORAL EVERY 8 HOURS
Refills: 0 | Status: DISCONTINUED | OUTPATIENT
Start: 2022-11-27 | End: 2022-11-29

## 2022-11-27 RX ADMIN — Medication 25 MILLIGRAM(S): at 05:57

## 2022-11-27 RX ADMIN — Medication 650 MILLIGRAM(S): at 08:06

## 2022-11-27 RX ADMIN — Medication 600 MILLIGRAM(S): at 14:25

## 2022-11-27 RX ADMIN — Medication 25 MILLIGRAM(S): at 09:58

## 2022-11-27 RX ADMIN — Medication 0.6 MILLIGRAM(S): at 17:31

## 2022-11-27 RX ADMIN — Medication 600 MILLIGRAM(S): at 13:55

## 2022-11-27 RX ADMIN — Medication 50 MILLIGRAM(S): at 17:31

## 2022-11-27 RX ADMIN — ENOXAPARIN SODIUM 40 MILLIGRAM(S): 100 INJECTION SUBCUTANEOUS at 13:55

## 2022-11-27 RX ADMIN — Medication 50 MILLIGRAM(S): at 12:17

## 2022-11-27 RX ADMIN — Medication 650 MILLIGRAM(S): at 07:36

## 2022-11-27 RX ADMIN — Medication 600 MILLIGRAM(S): at 21:51

## 2022-11-27 RX ADMIN — Medication 600 MILLIGRAM(S): at 21:21

## 2022-11-27 NOTE — PROGRESS NOTE ADULT - SUBJECTIVE AND OBJECTIVE BOX
Chief complaint: Patient is a 72y old  Female who presents with a chief complaint of Chest pain (26 Nov 2022 12:39)    Interval history: Tele revealed brief episodes of tachycardia/ a-fib. Now in NSR    Review of systems: A complete 10-point review of systems was obtained and is negative except as stated in the interval history.    Vitals:  T(F): 96, Max: 100.1 (11-26 @ 11:28)  HR: 80 (80 - 124)  BP: 104/63 (96/60 - 159/86)  RR: 18 (18 - 25)  SpO2: 95% (95% - 100%)    Ins & outs:     11-26 @ 07:01  -  11-27 @ 03:53  --------------------------------------------------------  IN: 420 mL / OUT: 900 mL / NET: -480 mL      Weight trend:  Weight (kg): 64.8 (11-26)    Physical exam:  General: No apparent distress  HEENT: Anicteric sclera. Moist mucous membranes. JVP *** cm.   Cardiac: Regular rate and rhythm. No murmurs, rubs, or gallops.   Vascular: Symmetric radial pulses. Dorsalis pedis pulses palpable.   Respiratory: Normal effort. Bibasilar crackles. Clear to ascultation.   Abdomen: Soft, nontender. Audible bowel sounds.   Extremities: Warm with *** edema. No cyanosis or clubbing.   Skin: Warm and dry. No rash.   Neurologic: Grossly normal motor function.   Psychiatric: Oriented to person, place, and time.     Data reviewed:  - Telemetry:   - ECG (date***):   - TTE (date***):   - Chest x-ray (date***):   - Stress test:   - CCTA:  - Cardiac catheterization:  - Cardiac MRI:    - Labs:                        13.0   7.14  )-----------( 219      ( 26 Nov 2022 08:37 )             38.6     11-26    138  |  102  |  12  ----------------------------<  119<H>  3.9   |  26  |  0.6<L>    Ca    9.2      26 Nov 2022 08:37  Mg     2.0     11-26    TPro  6.7  /  Alb  4.1  /  TBili  0.6  /  DBili  x   /  AST  37  /  ALT  45<H>  /  AlkPhos  88  11-26      Troponin T, Serum: <0.01 ng/mL (11-26-22 @ 19:56)  Troponin T, Serum: <0.01 ng/mL (11-26-22 @ 08:37)    Serum Pro-Brain Natriuretic Peptide: 882 pg/mL (11-26)      Triglycerides, Serum: 76 mg/dL (11-26-22 @ 19:56)  LDL Cholesterol Calculated: 71 mg/dL (11-26-22 @ 19:56)    Thyroid Stimulating Hormone, Serum: 0.68 uIU/mL (11-26-22 @ 19:56)        Medications:  colchicine 0.6 milliGRAM(s) Oral daily  enoxaparin Injectable 40 milliGRAM(s) SubCutaneous every 24 hours  ibuprofen  Tablet. 600 milliGRAM(s) Oral daily  metoprolol tartrate 25 milliGRAM(s) Oral every 8 hours    Drips: None    PRN:     Allergies    penicillin (Hives; Rash)    Intolerances      Assessment:      Problems discussed and associated plan:      Please contact me with any questions or concerns at [phone]. Chief complaint: Patient is a 72y old  Female who presents with a chief complaint of Chest pain (26 Nov 2022 12:39)    Interval history: Patient continues to have episodes of tachyarrhythmia/Afib to 150s on tele    Review of systems: A complete 10-point review of systems was obtained and is negative except as stated in the interval history.    Vitals:  T(F): 96, Max: 100.1 (11-26 @ 11:28)  HR: 80 (80 - 124)  BP: 104/63 (96/60 - 159/86)  RR: 18 (18 - 25)  SpO2: 95% (95% - 100%)    Ins & outs:     11-26 @ 07:01  -  11-27 @ 03:53  --------------------------------------------------------  IN: 420 mL / OUT: 900 mL / NET: -480 mL      Weight trend:  Weight (kg): 64.8 (11-26)    Physical exam:  General: No apparent distress  HEENT: Anicteric sclera. Moist mucous membranes.  Cardiac: irregular rate and rhythm. No murmurs, rubs, or gallops.   Vascular: Symmetric radial pulses. Dorsalis pedis pulses palpable.   Respiratory: Normal effort. Clear to ascultation.   Abdomen: Soft, nontender. Audible bowel sounds.   Extremities: Warm with no edema. No cyanosis or clubbing.   Skin: Warm and dry. No rash.   Neurologic: Grossly normal motor function.   Psychiatric: Oriented to person, place, and time.     Data reviewed:  - Telemetry: SR-Afib  - ECG :  < from: 12 Lead ECG (11.26.22 @ 10:19) >    Ventricular Rate 106 BPM    Atrial Rate 106 BPM    P-R Interval 134 ms    QRS Duration 78 ms    Q-T Interval 344 ms    QTC Calculation(Bazett) 456 ms    P Axis 11 degrees    R Axis 109 degrees    T Axis 42 degrees    Diagnosis Line Sinus tachycardia  Rightward axis  Low voltage QRS  ST elevation, consider early repolarization, pericarditis, or injury  Abnormal ECG    - TTE :  `< from: TTE Echo Complete w/o Contrast w/ Doppler (11.26.22 @ 13:48) >    Summary:   1. Normal global left ventricular systolic function.   2. LV Ejection Fraction by Oneill's Method with a biplane EF of 59 %.   3. Spectral Doppler shows impaired relaxation pattern of left   ventricular myocardial filling (Grade I diastolic dysfunction).   4. Normal left atrial size.   5. Normal right atrial size.   6. Small pericardial effusion.   7. Mild mitral valve regurgitation.   8. Mild tricuspid regurgitation.   9. Mild aortic regurgitation.  10. Mild pulmonic valve regurgitation.    - Chest x-ray:  `< from: Xray Chest 1 View-PORTABLE IMMEDIATE (Xray Chest 1 View-PORTABLE IMMEDIATE .) (11.26.22 @ 10:23) >    INTERPRETATION:  Clinical History / Reason for exam: STEMI    Comparison : Chest radiograph None.    Technique/Positioning: Frontal.    Findings:    Support devices: None.    Cardiac/mediastinum/hilum: Heart size within normal limits, thoracic   aortic calcification.    Lung parenchyma/Pleura: Within normal limits.    Skeleton/soft tissues: Unremarkable.    Impression:    No radiographic evidence of acute cardiopulmonary disease.      -CT Angio  < from: CT Angio Chest PE Protocol w/ IV Cont (11.26.22 @ 14:46) >      IMPRESSION:  No evidence of acute intrathoracic or pulmonary embolism.    - LE duplex  < from: [facility] Duplex Lower Ext Vein Scan, Bilat (11.26.22 @ 14:53) >    INTERPRETATION:  CLINICAL INFORMATION: The patient is a 72-year-old   female with lower surgery swelling. A venous duplex examination was   performed to evaluate the patient for deep venous thrombosis of the lower   extremities.    The common femoral, great saphenous, femoral, popliteal and small   saphenous veins were visualized bilaterally with no evidence of deep   venous thrombosis    All veins were fully compressible.  There was presence of spontaneous   flow, augmentation with distal compression and phasicity.    The anterior tibial veins were  patent    The posterior tibial veins were  patent    The peroneal veins were patent.    Impression:    No evidence of deep venous thrombosis or superficial thrombophlebitis in   the bilateral lower extremities.      - Labs:                        13.0   7.14  )-----------( 219      ( 26 Nov 2022 08:37 )             38.6     11-26    138  |  102  |  12  ----------------------------<  119<H>  3.9   |  26  |  0.6<L>    Ca    9.2      26 Nov 2022 08:37  Mg     2.0     11-26    TPro  6.7  /  Alb  4.1  /  TBili  0.6  /  DBili  x   /  AST  37  /  ALT  45<H>  /  AlkPhos  88  11-26      Troponin T, Serum: <0.01 ng/mL (11-26-22 @ 19:56)  Troponin T, Serum: <0.01 ng/mL (11-26-22 @ 08:37)    Serum Pro-Brain Natriuretic Peptide: 882 pg/mL (11-26)      Triglycerides, Serum: 76 mg/dL (11-26-22 @ 19:56)  LDL Cholesterol Calculated: 71 mg/dL (11-26-22 @ 19:56)    Thyroid Stimulating Hormone, Serum: 0.68 uIU/mL (11-26-22 @ 19:56)        Medications:  colchicine 0.6 milliGRAM(s) Oral daily  enoxaparin Injectable 40 milliGRAM(s) SubCutaneous every 24 hours  ibuprofen  Tablet. 600 milliGRAM(s) Oral daily  metoprolol tartrate 25 milliGRAM(s) Oral every 8 hours    Drips: None    PRN:     Allergies    penicillin (Hives; Rash)    Intolerances     Chief complaint: Patient is a 72y old  Female who presents with a chief complaint of Chest pain (26 Nov 2022 12:39)    Interval history: Patient continues to have episodes of symptomatic tachyarrhythmia/Afib to 150s on tele    Review of systems: A complete 10-point review of systems was obtained and is negative except as stated in the interval history.    Vitals:  T(F): 96, Max: 100.1 (11-26 @ 11:28)  HR: 80 (80 - 124)  BP: 104/63 (96/60 - 159/86)  RR: 18 (18 - 25)  SpO2: 95% (95% - 100%)    Ins & outs:     11-26 @ 07:01  -  11-27 @ 03:53  --------------------------------------------------------  IN: 420 mL / OUT: 900 mL / NET: -480 mL      Weight trend:  Weight (kg): 64.8 (11-26)    Physical exam:  General: No apparent distress  HEENT: Anicteric sclera. Moist mucous membranes.  Cardiac: irregular rate and rhythm. No murmurs, rubs, or gallops.   Vascular: Symmetric radial pulses. Dorsalis pedis pulses palpable.   Respiratory: Normal effort. Clear to ascultation.   Abdomen: Soft, nontender. Audible bowel sounds.   Extremities: Warm with no edema. No cyanosis or clubbing.   Skin: Warm and dry. No rash.   Neurologic: Grossly normal motor function.   Psychiatric: Oriented to person, place, and time.     Data reviewed:  - Telemetry: SR-Afib  - ECG :  < from: 12 Lead ECG (11.26.22 @ 10:19) >    Ventricular Rate 106 BPM    Atrial Rate 106 BPM    P-R Interval 134 ms    QRS Duration 78 ms    Q-T Interval 344 ms    QTC Calculation(Bazett) 456 ms    P Axis 11 degrees    R Axis 109 degrees    T Axis 42 degrees    Diagnosis Line Sinus tachycardia  Rightward axis  Low voltage QRS  ST elevation, consider early repolarization, pericarditis, or injury  Abnormal ECG    - TTE :  `< from: TTE Echo Complete w/o Contrast w/ Doppler (11.26.22 @ 13:48) >    Summary:   1. Normal global left ventricular systolic function.   2. LV Ejection Fraction by Oneill's Method with a biplane EF of 59 %.   3. Spectral Doppler shows impaired relaxation pattern of left   ventricular myocardial filling (Grade I diastolic dysfunction).   4. Normal left atrial size.   5. Normal right atrial size.   6. Small pericardial effusion.   7. Mild mitral valve regurgitation.   8. Mild tricuspid regurgitation.   9. Mild aortic regurgitation.  10. Mild pulmonic valve regurgitation.    - Chest x-ray:  `< from: Xray Chest 1 View-PORTABLE IMMEDIATE (Xray Chest 1 View-PORTABLE IMMEDIATE .) (11.26.22 @ 10:23) >    INTERPRETATION:  Clinical History / Reason for exam: STEMI    Comparison : Chest radiograph None.    Technique/Positioning: Frontal.    Findings:    Support devices: None.    Cardiac/mediastinum/hilum: Heart size within normal limits, thoracic   aortic calcification.    Lung parenchyma/Pleura: Within normal limits.    Skeleton/soft tissues: Unremarkable.    Impression:    No radiographic evidence of acute cardiopulmonary disease.      -CT Angio  < from: CT Angio Chest PE Protocol w/ IV Cont (11.26.22 @ 14:46) >      IMPRESSION:  No evidence of acute intrathoracic or pulmonary embolism.    - LE duplex  < from: VA Duplex Lower Ext Vein Scan, Bilat (11.26.22 @ 14:53) >    INTERPRETATION:  CLINICAL INFORMATION: The patient is a 72-year-old   female with lower surgery swelling. A venous duplex examination was   performed to evaluate the patient for deep venous thrombosis of the lower   extremities.    The common femoral, great saphenous, femoral, popliteal and small   saphenous veins were visualized bilaterally with no evidence of deep   venous thrombosis    All veins were fully compressible.  There was presence of spontaneous   flow, augmentation with distal compression and phasicity.    The anterior tibial veins were  patent    The posterior tibial veins were  patent    The peroneal veins were patent.    Impression:    No evidence of deep venous thrombosis or superficial thrombophlebitis in   the bilateral lower extremities.      - Labs:                        13.0   7.14  )-----------( 219      ( 26 Nov 2022 08:37 )             38.6     11-26    138  |  102  |  12  ----------------------------<  119<H>  3.9   |  26  |  0.6<L>    Ca    9.2      26 Nov 2022 08:37  Mg     2.0     11-26    TPro  6.7  /  Alb  4.1  /  TBili  0.6  /  DBili  x   /  AST  37  /  ALT  45<H>  /  AlkPhos  88  11-26      Troponin T, Serum: <0.01 ng/mL (11-26-22 @ 19:56)  Troponin T, Serum: <0.01 ng/mL (11-26-22 @ 08:37)    Serum Pro-Brain Natriuretic Peptide: 882 pg/mL (11-26)      Triglycerides, Serum: 76 mg/dL (11-26-22 @ 19:56)  LDL Cholesterol Calculated: 71 mg/dL (11-26-22 @ 19:56)    Thyroid Stimulating Hormone, Serum: 0.68 uIU/mL (11-26-22 @ 19:56)        Medications:  colchicine 0.6 milliGRAM(s) Oral daily  enoxaparin Injectable 40 milliGRAM(s) SubCutaneous every 24 hours  ibuprofen  Tablet. 600 milliGRAM(s) Oral daily  metoprolol tartrate 25 milliGRAM(s) Oral every 8 hours    Drips: None    PRN:     Allergies    penicillin (Hives; Rash)    Intolerances

## 2022-11-27 NOTE — PROVIDER CONTACT NOTE (OTHER) - SITUATION
Pt noted to be tachy on tele, c/o chest palpitations
Pt noted to be going in/out Afib, HR up 140s, pt c/o feeling palpitations B/P Stable

## 2022-11-27 NOTE — PROGRESS NOTE ADULT - ASSESSMENT
72 yr old female with no significant medical hx presenting with chest pain  radiating to the back, since last night associated with recent viral illness with a temp of 100.5 with rhinorrhea and cough along with an episode of afib in the ED. Patient admitted to  for further management of pericarditis.     Problems discussed and associated plan:  # Acute post viral Pericarditis  - hemodynamically stable  - EKG: NSR with no ischemia  - colchicine 0.6 mg every 12 hours   - Ibuprofen 600mg po TID  - tele monitoring    # Suspected New onset Paroxsymal Afib  - CHADVASC 2, no anticoagulation at this time d/t pericarditis/NSAID requirement   - Increase metoprolol to 50mg po q6h, consider long acting on d/c  - Monitor on tele     # RSV  - tylenol prn for fever  - BC pending    Please contact me with any questions or concerns at [phone].   72 yr old female with no significant medical hx presenting with chest pain  radiating to the back, since last night associated with recent viral illness with a temp of 100.5 with rhinorrhea and cough along with an episode of afib in the ED. Patient admitted to  for further management of pericarditis.     Problems discussed and associated plan:  # Acute viral Pericarditis  - hemodynamically stable  - EKG: NSR with no ischemia  - Continue colchicine 0.6 mg every 12 hours for 3 months  - Continue Ibuprofen 600mg po TID, will stop once chest pain resolves  - tele monitoring    # New onset Paroxsymal Afib/AFL  - Continue rate control strategy at this time given ongoing RSV infection.  - Increase metoprolol to 50mg po q6h, consider long acting on d/c  - CHADVASC 2, no anticoagulation indicated given low CHADSVASC (when 1 point is for gender) and high risk of bleeding with pericarditis/NSAID requirement  - Monitor on tele     # RSV  - tylenol prn for fever  - BC pending    Please contact me with any questions or concerns at [phone].

## 2022-11-28 ENCOUNTER — TRANSCRIPTION ENCOUNTER (OUTPATIENT)
Age: 72
End: 2022-11-28

## 2022-11-28 LAB
ANION GAP SERPL CALC-SCNC: 14 MMOL/L — SIGNIFICANT CHANGE UP (ref 7–14)
BUN SERPL-MCNC: 13 MG/DL — SIGNIFICANT CHANGE UP (ref 10–20)
CALCIUM SERPL-MCNC: 9.1 MG/DL — SIGNIFICANT CHANGE UP (ref 8.4–10.5)
CHLORIDE SERPL-SCNC: 104 MMOL/L — SIGNIFICANT CHANGE UP (ref 98–110)
CO2 SERPL-SCNC: 22 MMOL/L — SIGNIFICANT CHANGE UP (ref 17–32)
CREAT SERPL-MCNC: 0.6 MG/DL — LOW (ref 0.7–1.5)
EGFR: 95 ML/MIN/1.73M2 — SIGNIFICANT CHANGE UP
GLUCOSE SERPL-MCNC: 106 MG/DL — HIGH (ref 70–99)
HCT VFR BLD CALC: 39.8 % — SIGNIFICANT CHANGE UP (ref 37–47)
HCV AB S/CO SERPL IA: 0.04 COI — SIGNIFICANT CHANGE UP
HCV AB SERPL-IMP: SIGNIFICANT CHANGE UP
HGB BLD-MCNC: 13.1 G/DL — SIGNIFICANT CHANGE UP (ref 12–16)
MAGNESIUM SERPL-MCNC: 2.2 MG/DL — SIGNIFICANT CHANGE UP (ref 1.8–2.4)
MCHC RBC-ENTMCNC: 28.5 PG — SIGNIFICANT CHANGE UP (ref 27–31)
MCHC RBC-ENTMCNC: 32.9 G/DL — SIGNIFICANT CHANGE UP (ref 32–37)
MCV RBC AUTO: 86.5 FL — SIGNIFICANT CHANGE UP (ref 81–99)
NRBC # BLD: 0 /100 WBCS — SIGNIFICANT CHANGE UP (ref 0–0)
PLATELET # BLD AUTO: 307 K/UL — SIGNIFICANT CHANGE UP (ref 130–400)
POTASSIUM SERPL-MCNC: 4.2 MMOL/L — SIGNIFICANT CHANGE UP (ref 3.5–5)
POTASSIUM SERPL-SCNC: 4.2 MMOL/L — SIGNIFICANT CHANGE UP (ref 3.5–5)
RBC # BLD: 4.6 M/UL — SIGNIFICANT CHANGE UP (ref 4.2–5.4)
RBC # FLD: 12.6 % — SIGNIFICANT CHANGE UP (ref 11.5–14.5)
SODIUM SERPL-SCNC: 140 MMOL/L — SIGNIFICANT CHANGE UP (ref 135–146)
WBC # BLD: 6.48 K/UL — SIGNIFICANT CHANGE UP (ref 4.8–10.8)
WBC # FLD AUTO: 6.48 K/UL — SIGNIFICANT CHANGE UP (ref 4.8–10.8)

## 2022-11-28 PROCEDURE — 99233 SBSQ HOSP IP/OBS HIGH 50: CPT

## 2022-11-28 RX ADMIN — Medication 600 MILLIGRAM(S): at 23:00

## 2022-11-28 RX ADMIN — Medication 0.6 MILLIGRAM(S): at 05:22

## 2022-11-28 RX ADMIN — Medication 50 MILLIGRAM(S): at 05:22

## 2022-11-28 RX ADMIN — Medication 50 MILLIGRAM(S): at 23:01

## 2022-11-28 RX ADMIN — Medication 600 MILLIGRAM(S): at 14:11

## 2022-11-28 RX ADMIN — Medication 600 MILLIGRAM(S): at 05:52

## 2022-11-28 RX ADMIN — Medication 0.6 MILLIGRAM(S): at 17:18

## 2022-11-28 RX ADMIN — Medication 600 MILLIGRAM(S): at 15:13

## 2022-11-28 RX ADMIN — Medication 50 MILLIGRAM(S): at 17:18

## 2022-11-28 RX ADMIN — Medication 600 MILLIGRAM(S): at 05:22

## 2022-11-28 RX ADMIN — Medication 50 MILLIGRAM(S): at 00:00

## 2022-11-28 RX ADMIN — ENOXAPARIN SODIUM 40 MILLIGRAM(S): 100 INJECTION SUBCUTANEOUS at 14:12

## 2022-11-28 RX ADMIN — Medication 50 MILLIGRAM(S): at 11:46

## 2022-11-28 NOTE — PROGRESS NOTE ADULT - ASSESSMENT
Assessment:  72 yr old female with no significant medical hx presenting with chest pain  radiating to the back, since last night associated with recent viral illness with a temp of 100.5 with rhinorrhea and cough. IN the ED, code STEMI activated then cancelled. Pt was found to be RSV + in the ED and had an episode of PAF then self-converted to NSR.  Patient admitted to  for further management of viral pericarditis, new onset PAF/AFL, and RSV.     Problems discussed and associated plan:  # Acute viral Pericarditis  - hemodynamically stable  - Continue colchicine 0.6 mg every 12 hours for 3 months  - Continue Ibuprofen 600mg po TID, will stop once chest pain resolves  - tele monitoring    # New onset Paroxsymal AFIB/AFL  - Continue rate control strategy at this time given ongoing RSV infection.  - Cont metoprolol tartrate 50mg q6h. To consider CCB depending on BP.  - CHADVASC 2, no anticoagulation indicated given low CHADSVASC (when 1 point is for gender) and high risk of bleeding with pericarditis/NSAID requirement  - Monitor on tele     # RSV  - cont with supportive care  - tylenol prn for fever  - BCNGTD prelim            Please contact me with any questions or concerns at [phone]. Assessment:  72 yr old female with no significant medical hx presenting with chest pain  radiating to the back, since last night associated with recent viral illness with a temp of 100.5 with rhinorrhea and cough. IN the ED, code STEMI activated then cancelled. Pt was found to be RSV + in the ED and had an episode of PAF then self-converted to NSR.  Patient admitted to  for further management of viral pericarditis, new onset PAF/AFL, and RSV.     Problems discussed and associated plan:  # Acute viral Pericarditis  - hemodynamically stable  - Continue colchicine 0.6 mg every 12 hours for 3 months  - Continue Ibuprofen 600mg po TID, will stop once chest pain resolves  - tele monitoring    # New onset Paroxsymal AFIB/AFL  - Continue rate control strategy at this time given ongoing RSV infection.  - Cont metoprolol tartrate 50mg q6h. To consider low-dose CCB for rate control, depending on BP.  - CHADVASC 2, no anticoagulation indicated given low CHADSVASC (when 1 point is for gender) and high risk of bleeding with pericarditis/NSAID requirement  - Monitor on telemetry  - EP will make an OP f/u appt in 3 weeks with Dr. Staley.     # RSV  - cont with supportive care  - Tylenol PRN for fever  - BCNGTD prelim      Please contact me with any questions or concerns at [phone].

## 2022-11-28 NOTE — PROGRESS NOTE ADULT - NS ATTEND AMEND GEN_ALL_CORE FT
72yoF with no significant PMHx who presented with chest discomfort, found to have viral pericarditis (in the s/o RSV) as well as paroxysmal Afib with RVR.     VIral pericarditis was diagnosed based on pleuritic chest pain, elevated inflammatory markers, and small pericardial effusion. ECG with Q waves and ST elevations inferiorly, though no known history of CAD. TTE with normal LVEF. She is being treated with colchicine and ibuprofen.     For her pAfib, patient has never been diagnosed with an arrhythmia. Given her CHADS VASC score of 2, she does not require anticoagulation, and I would defer in the setting of pericarditis as to avoid any possible hemorrhage into the pericardium. Patient is on Lopressor 50 mg q6h with episodes of RVR as well as hypotension while in sinus rhythm. Will closely monitor on telemetry and if patient requires additional rate control, she will be started on diltiazem PO.     I spoke to the patient, her daughter, and her son for >40 minutes and explained that her RSV and viral pericarditis is likely driving her Afib with RVR, and as her viral illness improves, I suspect she will have better rate/rhythm control.     Plan:   - Continue Lopressor 50 mg q6h  - If patient with frequent Afib with RVR, will consider adding diltiazem if BP can tolerate  - No anticoagulation at this time  - Patient will follow-up with EP in 2 weeks, at which time AC may be considered  - Continue 3 months of colchicine 0.6 mg BID  - Continue ibuprofen 600 mg q8h, and will downtitrate slowly in the outpatient setting when inflammatory markers improve/normalize  - Supportive care for RSV  - Planning for discharge tomorrow
Agree with assessment and plan, as I have documented above.  Reviewed TTE, which demonstrated normal LV and RV function, mild AI, and no evidence of PH. Suspect that AF/AFL is related to inflammation from pericarditis and that it will improve as RSV is treated. Continue rate control strategy, will increase metoprolol to 50 q6hr today. Would defer AC given low CHADSVASC (woman require CHADSVASC greater than or equal to 3 to require AC), and high risk of bleeding on ibuprofen. Continue colchicine and ibuprofen for Pericarditis. Supportive care for RSV.

## 2022-11-28 NOTE — DISCHARGE NOTE NURSING/CASE MANAGEMENT/SOCIAL WORK - PATIENT PORTAL LINK FT
You can access the FollowMyHealth Patient Portal offered by [facility] by registering at the following website: [email or web address]. By joining [facility]’s FollowMyHealth portal, you will also be able to view your health information using other applications (apps) compatible with our system.

## 2022-11-28 NOTE — PROGRESS NOTE ADULT - TIME BILLING
Bedside evaluation and exam  Conversation with patient and family   Care coordination for outpatient EP follow-up  Review of medical records and tele
Interviewed and examined patient. Reviewed hospital course, ECG, TTE, CXR, tele, lab work, and medications. Discussed plan with patient and family.

## 2022-11-28 NOTE — DISCHARGE NOTE NURSING/CASE MANAGEMENT/SOCIAL WORK - NSDCPEFALRISK_GEN_ALL_CORE
For information on Fall & Injury Prevention, visit: [email or web address] OR  [email or web address] OR  https://www.cdc.gov/steadi/patient.html

## 2022-11-28 NOTE — PROGRESS NOTE ADULT - SUBJECTIVE AND OBJECTIVE BOX
Chief complaint: Patient is a 72y old  Female who presents with a chief complaint of Chest pain (27 Nov 2022 03:52)    Interval history:  Seen and examined patient this AM with daughter at bedside.   Pt reports still feeling fatigued and endorses having a intermittent productive cough. However, wishes to not take any medication for cough at this time.   Overnight events: 2 episodes of PAF HR up to 130s on telemetry  On telemetry: NSR 75-98    Review of systems: A complete 10-point review of systems was obtained and is negative except as stated in the interval history.    Vitals:  T(F): 97.9, Max: 98.9 (11-27 @ 16:12)  HR: 79 (79 - 129)  BP: 108/71 (88/59 - 108/71)  RR: 18 (18 - 22)  SpO2: 97% (95% - 97%)    Ins & outs:     11-26 @ 07:01  -  11-27 @ 07:00  --------------------------------------------------------  IN: 540 mL / OUT: 900 mL / NET: -360 mL    11-27 @ 07:01  -  11-28 @ 07:00  --------------------------------------------------------  IN: 440 mL / OUT: 1400 mL / NET: -960 mL      Weight trend:  Weight (kg): 64.6 (11-27)    Physical exam:  General: No apparent distress, resting comfortably in bed.  HEENT: Anicteric sclera. Moist mucous membranes. JVD negative.   Cardiac: Regular rate and rhythm. No murmurs, rubs, or gallops.   Vascular: Symmetric radial pulses. +2 Dorsalis pedis pulses palpable.   Respiratory: Normal effort. Clear lung sounds bilaterally upon auscultation.  Abdomen: Soft, nontender. Audible bowel sounds.   Extremities: Warm with no edema. No cyanosis or clubbing.   Skin: Warm and dry. No rash.   Neurologic: Grossly normal motor function.   Psychiatric: Oriented to person, place, and time.     Data reviewed:  - Telemetry: NSR 75-98  - ECG :  < from: 12 Lead ECG (11.26.22 @ 10:19) >    Ventricular Rate 106 BPM    Atrial Rate 106 BPM    P-R Interval 134 ms    QRS Duration 78 ms    Q-T Interval 344 ms    QTC Calculation(Bazett) 456 ms    P Axis 11 degrees    R Axis 109 degrees    T Axis 42 degrees    Diagnosis Line Sinus tachycardia  Rightward axis  Low voltage QRS  ST elevation, consider early repolarization, pericarditis, or injury  Abnormal ECG    - TTE :  < from: TTE Echo Complete w/o Contrast w/ Doppler (11.26.22 @ 13:48) >    Summary:   1. Normal global left ventricular systolic function.   2. LV Ejection Fraction by Oneill's Method with a biplane EF of 59 %.   3. Spectral Doppler shows impaired relaxation pattern of left   ventricular myocardial filling (Grade I diastolic dysfunction).   4. Normal left atrial size.   5. Normal right atrial size.   6. Small pericardial effusion.   7. Mild mitral valve regurgitation.   8. Mild tricuspid regurgitation.   9. Mild aortic regurgitation.  10. Mild pulmonic valve regurgitation.    - Chest x-ray:  `< from: Xray Chest 1 View-PORTABLE IMMEDIATE (Xray Chest 1 View-PORTABLE IMMEDIATE .) (11.26.22 @ 10:23) >    INTERPRETATION:  Clinical History / Reason for exam: STEMI    Comparison : Chest radiograph None.    Technique/Positioning: Frontal.    Findings:    Support devices: None.    Cardiac/mediastinum/hilum: Heart size within normal limits, thoracic   aortic calcification.    Lung parenchyma/Pleura: Within normal limits.    Skeleton/soft tissues: Unremarkable.    Impression:    No radiographic evidence of acute cardiopulmonary disease.      -CT Angio  < from: CT Angio Chest PE Protocol w/ IV Cont (11.26.22 @ 14:46) >      IMPRESSION:  No evidence of acute intrathoracic or pulmonary embolism.    - LE duplex  < from: VA Duplex Lower Ext Vein Scan, Bilat (11.26.22 @ 14:53) >    INTERPRETATION:  CLINICAL INFORMATION: The patient is a 72-year-old   female with lower surgery swelling. A venous duplex examination was   performed to evaluate the patient for deep venous thrombosis of the lower   extremities.    The common femoral, great saphenous, femoral, popliteal and small   saphenous veins were visualized bilaterally with no evidence of deep   venous thrombosis    All veins were fully compressible.  There was presence of spontaneous   flow, augmentation with distal compression and phasicity.    The anterior tibial veins were  patent    The posterior tibial veins were  patent    The peroneal veins were patent.    Impression:    No evidence of deep venous thrombosis or superficial thrombophlebitis in   the bilateral lower extremities.        - Labs:                        13.1   6.48  )-----------( 307      ( 28 Nov 2022 05:37 )             39.8     11-28    140  |  104  |  13  ----------------------------<  106<H>  4.2   |  22  |  0.6<L>    Ca    9.1      28 Nov 2022 05:37  Mg     2.2     11-28        Troponin T, Serum: <0.01 ng/mL (11-26-22 @ 19:56)  Troponin T, Serum: <0.01 ng/mL (11-26-22 @ 08:37)    Serum Pro-Brain Natriuretic Peptide: 882 pg/mL (11-26)      Triglycerides, Serum: 76 mg/dL (11-26-22 @ 19:56)  LDL Cholesterol Calculated: 71 mg/dL (11-26-22 @ 19:56)    Thyroid Stimulating Hormone, Serum: 0.68 uIU/mL (11-26-22 @ 19:56)        Medications:  colchicine 0.6 milliGRAM(s) Oral two times a day  enoxaparin Injectable 40 milliGRAM(s) SubCutaneous every 24 hours  ibuprofen  Tablet. 600 milliGRAM(s) Oral every 8 hours  metoprolol tartrate 50 milliGRAM(s) Oral every 6 hours    Drips:    PRN:     Allergies    penicillin (Hives; Rash)    Intolerances

## 2022-11-29 ENCOUNTER — TRANSCRIPTION ENCOUNTER (OUTPATIENT)
Age: 72
End: 2022-11-29

## 2022-11-29 VITALS — DIASTOLIC BLOOD PRESSURE: 79 MMHG | TEMPERATURE: 98 F | SYSTOLIC BLOOD PRESSURE: 117 MMHG | HEART RATE: 77 BPM

## 2022-11-29 LAB
ANION GAP SERPL CALC-SCNC: 11 MMOL/L — SIGNIFICANT CHANGE UP (ref 7–14)
BUN SERPL-MCNC: 13 MG/DL — SIGNIFICANT CHANGE UP (ref 10–20)
CALCIUM SERPL-MCNC: 8.6 MG/DL — SIGNIFICANT CHANGE UP (ref 8.4–10.5)
CHLORIDE SERPL-SCNC: 106 MMOL/L — SIGNIFICANT CHANGE UP (ref 98–110)
CO2 SERPL-SCNC: 23 MMOL/L — SIGNIFICANT CHANGE UP (ref 17–32)
CREAT SERPL-MCNC: 0.6 MG/DL — LOW (ref 0.7–1.5)
EGFR: 95 ML/MIN/1.73M2 — SIGNIFICANT CHANGE UP
GLUCOSE SERPL-MCNC: 90 MG/DL — SIGNIFICANT CHANGE UP (ref 70–99)
HCT VFR BLD CALC: 36.2 % — LOW (ref 37–47)
HGB BLD-MCNC: 12 G/DL — SIGNIFICANT CHANGE UP (ref 12–16)
MAGNESIUM SERPL-MCNC: 2.1 MG/DL — SIGNIFICANT CHANGE UP (ref 1.8–2.4)
MCHC RBC-ENTMCNC: 28.7 PG — SIGNIFICANT CHANGE UP (ref 27–31)
MCHC RBC-ENTMCNC: 33.1 G/DL — SIGNIFICANT CHANGE UP (ref 32–37)
MCV RBC AUTO: 86.6 FL — SIGNIFICANT CHANGE UP (ref 81–99)
NRBC # BLD: 0 /100 WBCS — SIGNIFICANT CHANGE UP (ref 0–0)
PLATELET # BLD AUTO: 310 K/UL — SIGNIFICANT CHANGE UP (ref 130–400)
POTASSIUM SERPL-MCNC: 4.2 MMOL/L — SIGNIFICANT CHANGE UP (ref 3.5–5)
POTASSIUM SERPL-SCNC: 4.2 MMOL/L — SIGNIFICANT CHANGE UP (ref 3.5–5)
RBC # BLD: 4.18 M/UL — LOW (ref 4.2–5.4)
RBC # FLD: 12.4 % — SIGNIFICANT CHANGE UP (ref 11.5–14.5)
SODIUM SERPL-SCNC: 140 MMOL/L — SIGNIFICANT CHANGE UP (ref 135–146)
WBC # BLD: 5.36 K/UL — SIGNIFICANT CHANGE UP (ref 4.8–10.8)
WBC # FLD AUTO: 5.36 K/UL — SIGNIFICANT CHANGE UP (ref 4.8–10.8)

## 2022-11-29 PROCEDURE — 99239 HOSP IP/OBS DSCHRG MGMT >30: CPT

## 2022-11-29 RX ORDER — IBUPROFEN 200 MG
1 TABLET ORAL
Qty: 42 | Refills: 0
Start: 2022-11-29 | End: 2022-12-12

## 2022-11-29 RX ORDER — CHLORHEXIDINE GLUCONATE 213 G/1000ML
1 SOLUTION TOPICAL DAILY
Refills: 0 | Status: DISCONTINUED | OUTPATIENT
Start: 2022-11-29 | End: 2022-11-29

## 2022-11-29 RX ORDER — METOPROLOL TARTRATE 50 MG
50 TABLET ORAL EVERY 8 HOURS
Refills: 0 | Status: DISCONTINUED | OUTPATIENT
Start: 2022-11-29 | End: 2022-11-29

## 2022-11-29 RX ORDER — METOPROLOL TARTRATE 50 MG
1 TABLET ORAL
Qty: 90 | Refills: 2
Start: 2022-11-29 | End: 2023-02-26

## 2022-11-29 RX ORDER — COLCHICINE 0.6 MG
1 TABLET ORAL
Qty: 180 | Refills: 2
Start: 2022-11-29 | End: 2023-08-25

## 2022-11-29 RX ORDER — ACETAMINOPHEN 500 MG
2 TABLET ORAL
Qty: 0 | Refills: 0 | DISCHARGE

## 2022-11-29 RX ADMIN — CHLORHEXIDINE GLUCONATE 1 APPLICATION(S): 213 SOLUTION TOPICAL at 11:49

## 2022-11-29 RX ADMIN — ENOXAPARIN SODIUM 40 MILLIGRAM(S): 100 INJECTION SUBCUTANEOUS at 13:10

## 2022-11-29 RX ADMIN — Medication 50 MILLIGRAM(S): at 13:09

## 2022-11-29 RX ADMIN — Medication 0.6 MILLIGRAM(S): at 17:22

## 2022-11-29 RX ADMIN — Medication 600 MILLIGRAM(S): at 05:25

## 2022-11-29 RX ADMIN — Medication 600 MILLIGRAM(S): at 00:17

## 2022-11-29 RX ADMIN — Medication 50 MILLIGRAM(S): at 05:14

## 2022-11-29 RX ADMIN — Medication 0.6 MILLIGRAM(S): at 05:14

## 2022-11-29 RX ADMIN — Medication 600 MILLIGRAM(S): at 05:14

## 2022-11-29 NOTE — DISCHARGE NOTE PROVIDER - ATTENDING DISCHARGE PHYSICAL EXAMINATION:
Patient with regular rate and rhythm and clear lungs. Telemetry shows no atrial fibrillation > 24 hours, with sinus rhythm ranging from HR 60s-100s. I met with the patient and  at bedside, and we called her son and daughter on the phone. We reviewed her RSV, viral pericarditis, and pAF. Patient understands her medication regimen and she will follow up with Dr. Cutler and Dr. Staley. We discussed withholding initiation of anticoagulation as CHADS VASC score 2 and patient is at risk of bleeding in setting of pericarditis and NSAID.

## 2022-11-29 NOTE — DISCHARGE NOTE PROVIDER - NSDCFUADDINST_GEN_ALL_CORE_FT
Please continue ibuprofen 600mg every 8 hours for two weeks, then decrease to 400mg every 8 hours for two weeks, then 200 mg every 8 hours for two weeks. Please continue 0.6 mg twice a day for three months.   Please continue ibuprofen 600mg every 8 hours for two weeks, then decrease to 400mg every 8 hours for two weeks, then 200 mg every 8 hours for two weeks. Please continue colchicine 0.6 mg twice a day for three months.   Please continue ibuprofen 600mg every 8 hours for two weeks, then decrease to 400mg every 8 hours for two weeks, then 200 mg every 8 hours for two weeks.

## 2022-11-29 NOTE — DISCHARGE NOTE PROVIDER - CARE PROVIDER_API CALL
Abhishek Staley; VANESSA)  [place] [address]  Phone: [phone]  Fax: [phone]  Scheduled Appointment: 12/09/2022 03:30 PM   Abhishek Staley; VANESSA)  CardiologyElectrophyslgy [address] [address]  Phone: [phone]  Fax: [phone]  Scheduled Appointment: 12/09/2022 03:30 PM    Michelle Cutler; MPH)  Internal Medicine  [address] [place] [address], [address]  [place] [address]  Phone: [phone]  Fax: [phone]  Scheduled Appointment: 12/06/2022 02:30 PM    Miracle Leal)  Geriatric Medicine; Internal Medicine  [address] [place] [address]  Phone: [phone]  Fax: [phone]  Scheduled Appointment: 12/07/2022 01:20 PM

## 2022-11-29 NOTE — PROGRESS NOTE ADULT - ASSESSMENT
Assessment:  72 yr old female with no significant medical hx presenting with chest pain  radiating to the back, since last night associated with recent viral illness with a temp of 100.5 with rhinorrhea and cough. IN the ED, code STEMI activated then cancelled. Pt was found to be RSV + in the ED, elevated CRP, and had an episode of PAF then self-converted to NSR.  Patient admitted to  for further management of viral pericarditis, new onset PAF/AFL, and RSV.     Problems discussed and associated plan:  # Acute viral Pericarditis  - hemodynamically stable  - Continue colchicine 0.6 mg BID for 3 months  - Continue Ibuprofen 600mg TID  - Will titrate NSAID slowly upon dc: to cont ibuprofen 600mg q8 x 2 weeks, then decrease to 400mg q8 x 2 weeks, and then 200mg q8 x 2 weeks.   - continue telemetry monitoring  - repeat EKG prior to DC    # New onset Paroxsymal AFIB/AFL  - Continue rate control strategy at this time given ongoing RSV infection.   - Decreased frequency of Metoprolol tartrate 50mg to q8 from q6.  - EVAN VASC 2, no anticoagulation indicated given low CHADSVASC (when 1 point is for gender) and high risk of bleeding with pericarditis/NSAID requirement  - Monitor on telemetry  - Pt will f/u with EP as outpatient with Dr. Staley to consider AC    # RSV  - cont with supportive care  - Tylenol PRN for fever  - BCNGTD preliminary result      Dispo: continue to monitor heart rate on telemetry, possible DC home today    Please contact me with any questions or concerns at [phone].

## 2022-11-29 NOTE — DISCHARGE NOTE PROVIDER - HOSPITAL COURSE
Mrs. Najera is a 72y old female with no PMHx who presents to the ED with intermittent midsternal chest pain radiating to the right scapula for 1 night while she was laying in bed. She states that the chest pain is worse when she lays down flat and takes a deep breath and alleviated when she sit forward. Patient was febrile with temp of 100.5F, rhinorrhea, and non productive cough.    In the ED, patient was a code STEMI and was subsequently cancelled once repeat EKG was done showing diffuse ST elevations. Patient was found to be +RSV, elevated .7 and procalcitonin 0.16, and troponin negative x 1. CXR was negative for cardiopulmonary disease. ASA 324mg x 1 was given; started on colchicine 0.6mg and ibuprofen 600mg. While in the ED, Patient also had an brief episode of atrial fibrillation and reverted back to SR spontaneously. Patient was then admitted to cardiac telemetry for further management of viral pericarditis, new onset of Paroxysmal atrial fibrillation/ atrial flutter, and RSV+.    While admitted to cardiac telemetry, patient continued to have brief episodes of PAF up to 150s and was started on Metoprolol tartrate 50mg Q6 for rate control. Given low EVAN VASC score of 2 (age and gender), active viral illness, and high bleeding risk while on NSAID requirement, no anticoagulation was started. Patient continued on contact/droplet isolation precautions for RSV, blood cultures were drawn and NTD, troponin negative x 2. TTE (11/26) showed EF of 59%, Normal global left ventricular systolic function, small pericardial effusion, Mild mitral valve regurgitation, Mild tricuspid regurgitation, Mild aortic regurgitation, and Mild pulmonic valve regurgitation.      On 11/29/22 metoprolol tartrate 50mg decreased to every 6 hours and HR well controlled 65-95, maintained in NSR on telemetry. Repeat EKG showed NSR with no ischemia. Patient reports chest pain/discomfort has resolved. Denies SOB, N/V, lightheadedness. Patient is HD stable with no complaints. Patient will be discharged on Metoprolol tartrate 50mg Q6, colchicine 0.6mg BID, and slow taper of Ibuprofen 600mg TID x 2 weeks, then 400mg TID x 2 weeks, then 200mg TID x 2 weeks. Outpatient sleep study is recommended. Patient will follow-up with Dr. Staley to consider AC and further therapies for PAF once recovered from viral illness, Dr. Leal, and Dr. Cutler as outpatient.        Mrs. Najera is a 72y old female with no PMHx who presents to the ED with intermittent midsternal chest pain radiating to the right scapula for 1 night while she was laying in bed. She states that the chest pain is worse when she lays down flat and takes a deep breath and alleviated when she sit forward. Patient was febrile with temp of 100.5F, rhinorrhea, and non productive cough.    In the ED, patient was a code STEMI and was subsequently cancelled once repeat EKG was done showing diffuse ST elevations. Patient was found to be +RSV, elevated .7 and procalcitonin 0.16, and troponin negative x 1. CXR was negative for cardiopulmonary disease. ASA 324mg x 1 was given; started on colchicine 0.6mg and ibuprofen 600mg. While in the ED, Patient also had an brief episode of atrial fibrillation and reverted back to SR spontaneously. Patient was then admitted to cardiac telemetry for further management of viral pericarditis, new onset of Paroxysmal atrial fibrillation/ atrial flutter, and RSV+.    While admitted to cardiac telemetry, patient continued to have brief episodes of PAF up to 150s and was started on Metoprolol tartrate 50mg Q6 for rate control. Given low EVAN VASC score of 2 (age and gender), active viral illness, and high bleeding risk while on NSAID requirement, no anticoagulation was started. Patient continued on contact/droplet isolation precautions for RSV, blood cultures were drawn and NTD, troponin negative x 2. TTE (11/26) showed EF of 59%, Normal global left ventricular systolic function, small pericardial effusion, Mild mitral valve regurgitation, Mild tricuspid regurgitation, Mild aortic regurgitation, and Mild pulmonic valve regurgitation.      On 11/29/22 metoprolol tartrate 50mg decreased to every 6 hours and HR well controlled 65-95, maintained in NSR on telemetry. Repeat EKG showed NSR with no ischemia. Patient reports chest pain/discomfort has resolved. Denies SOB, N/V, lightheadedness. Patient is HD stable with no complaints. Patient will be discharged on Metoprolol tartrate 50mg Q6, colchicine 0.6mg twice a day for three months, and slow taper of Ibuprofen 600mg TID x 2 weeks, then 400mg TID x 2 weeks, then 200mg TID x 2 weeks. Outpatient sleep study is recommended. Patient will follow-up with Dr. Staley to consider AC and further therapies for PAF once recovered from viral illness, Dr. Leal, and Dr. Cutler as outpatient.        Mrs. Najera is a 72y old female with no PMHx who presents to the ED with intermittent midsternal chest pain radiating to the right scapula for 1 night while she was laying in bed. She states that the chest pain is worse when she lays down flat and takes a deep breath and alleviated when she sit forward. Patient was febrile with temp of 100.5F, rhinorrhea, and non productive cough.    In the ED, patient was a code STEMI and was subsequently cancelled once repeat EKG was done showing diffuse ST elevations. Patient was found to be +RSV, elevated .7 and procalcitonin 0.16, and troponin negative x 1. CXR was negative for cardiopulmonary disease. ASA 324mg x 1 was given; started on colchicine 0.6mg and ibuprofen 600mg. While in the ED, Patient also had an brief episode of atrial fibrillation and reverted back to SR spontaneously. Patient was then admitted to cardiac telemetry for further management of viral pericarditis, new onset of Paroxysmal atrial fibrillation/ atrial flutter, and RSV+.    While admitted to cardiac telemetry, patient continued to have brief episodes of PAF up to 150s and was started on Metoprolol tartrate 50mg Q6 for rate control. Given low EVAN VASC score of 2 (age and gender), active viral illness, and high bleeding risk while on NSAID requirement, no anticoagulation was started. Patient continued on contact/droplet isolation precautions for RSV, blood cultures were drawn and NTD, troponin negative x 2. TTE (11/26) showed EF of 59%, Normal global left ventricular systolic function, small pericardial effusion, Mild mitral valve regurgitation, Mild tricuspid regurgitation, Mild aortic regurgitation, and Mild pulmonic valve regurgitation.      On 11/29/22, metoprolol tartrate 50mg changed to Q8 and HR well controlled 65-95, maintained in NSR on telemetry. Repeat EKG showed NSR with no ischemia. Patient reports chest pain/discomfort has resolved. Denies SOB, N/V, lightheadedness. Patient is HD stable with no complaints. Patient will be discharged on Metoprolol tartrate 50mg every 8 hours, colchicine 0.6mg twice a day for three months, and slow taper of Ibuprofen 600mg TID x 2 weeks, then 400mg TID x 2 weeks, then 200mg TID x 2 weeks. Outpatient sleep study is recommended. Patient will follow-up with Dr. Staley to consider AC and further therapies for PAF once recovered from viral illness, Dr. Leal, and Dr. Cutler as outpatient.        Mrs. Najera is a 72y old female with no PMHx who presents to the ED with intermittent midsternal chest pain radiating to the right scapula for 1 night while she was laying in bed. She states that the chest pain is worse when she lays down flat and takes a deep breath and alleviated when she sit forward. Patient was febrile with temp of 100.5F, rhinorrhea, and non productive cough.    In the ED, patient was a code STEMI and was subsequently cancelled once repeat EKG was done showing diffuse ST elevations. Patient was found to be +RSV, elevated ESR 90 and .7, procalcitonin 0.16, and troponin negative x 1. CXR was negative for cardiopulmonary disease. ASA 324mg x 1 was given; started on colchicine 0.6mg and ibuprofen 600mg q8h. While in the ED, Patient also had an brief episode of atrial fibrillation and reverted back to SR spontaneously. Patient was then admitted to cardiac telemetry for further management of viral pericarditis, new onset of Paroxysmal atrial fibrillation/ atrial flutter, and RSV+.    While admitted to cardiac telemetry, patient continued to have brief episodes of pAF up to 150s and was started on Metoprolol tartrate 50mg Q6 for rate control. Given low EVAN VASC score of 2 (age and gender), active viral illness, and high bleeding risk while on NSAID requirement, no anticoagulation was started. Patient continued on contact/droplet isolation precautions for RSV, blood cultures were drawn and NTD. TTE (11/26) showed EF of 59%, Normal global left ventricular systolic function, small pericardial effusion, Mild mitral valve regurgitation, Mild tricuspid regurgitation, Mild aortic regurgitation, and Mild pulmonic valve regurgitation.    On 11/29/22, metoprolol tartrate 50mg changed to Q8 and HR well controlled 65-95, maintained in NSR on telemetry. Repeat EKG showed NSR with no ischemia. Patient reports chest pain/discomfort has resolved. Denies SOB, N/V, and lightheadedness. Patient is HD stable with no complaints. Patient will be discharged on Metoprolol tartrate 50mg every 8 hours, colchicine 0.6mg twice a day for three months, and slow taper of Ibuprofen 600mg TID x 2 weeks, then 400mg TID x 2 weeks, then 200mg TID x 2 weeks. Outpatient sleep study is recommended. Patient will follow-up with Dr. Staley to consider AC and further therapies for PAF once recovered from viral illness, Dr. Leal, and Dr. Cutler as outpatient.

## 2022-11-29 NOTE — DISCHARGE NOTE PROVIDER - NSDCFUSCHEDAPPT_GEN_ALL_CORE_FT
Abhishek Staley  [facility] Physician [facility]  CARDIOLOGY [address] [facility]  Scheduled Appointment: 12/09/2022     Michelle Cutler  [facility] Physician [facility]  CARDIOLOGY [address]  Scheduled Appointment: 12/06/2022    Abhishek Staley  [facility]  CARDIOLOGY [address] [facility]  Scheduled Appointment: 12/09/2022

## 2022-11-29 NOTE — PROGRESS NOTE ADULT - SUBJECTIVE AND OBJECTIVE BOX
Chief complaint: Patient is a 72y old  Female who presents with a chief complaint of Chest pain (29 Nov 2022 12:36)    Interval history:  Seen and examined patient at bedside this AM.   No acute overnight events.   Patient reports being able to sleep for a few hours and feels better than yesterday, less fatigued.   Pt still endorses intermittent cough, productive at times.   Denies CP and SOB, N/V, fevers, chills.     Review of systems: A complete 10-point review of systems was obtained and is negative except as stated in the interval history.    Vitals:  T(F): 97.6, Max: 98 (11-28 @ 23:00)  HR: 68 (68 - 82)  BP: 121/72 (106/67 - 121/72)  RR: 18 (17 - 18)  SpO2: 95% (95% - 95%)    Ins & outs:     11-26 @ 07:01  -  11-27 @ 07:00  --------------------------------------------------------  IN: 540 mL / OUT: 900 mL / NET: -360 mL    11-27 @ 07:01 - 11-28 @ 07:00  --------------------------------------------------------  IN: 440 mL / OUT: 1400 mL / NET: -960 mL    11-29 @ 07:01 - 11-29 @ 13:56  --------------------------------------------------------  IN: 240 mL / OUT: 0 mL / NET: 240 mL      Weight trend:  Weight (kg): 64.6 (11-27)    Physical exam:  General: No apparent distress  HEENT: Anicteric sclera. Moist mucous membranes. JVD negative.   Cardiac: Regular rate and rhythm. No murmurs, rubs, or gallops.   Vascular: Symmetric radial pulses.+2 Dorsalis pedis pulses palpable.   Respiratory: Normal effort. Clear lungs bilaterally to ascultation.   Abdomen: Soft, nontender. Audible bowel sounds.   Extremities: Warm with no edema. No cyanosis or clubbing.   Skin: Warm and dry. No rash.   Neurologic: Grossly normal motor function.   Psychiatric: Oriented to person, place, and time.     Data reviewed:  - Telemetry: NSR 62-87  - ECG :  < from: 12 Lead ECG (11.27.22 @ 05:51) >    Ventricular Rate 97 BPM    Atrial Rate 97 BPM    P-R Interval 140 ms    QRS Duration 92 ms    Q-T Interval 356 ms    QTC Calculation(Bazett) 452 ms    P Axis 2 degrees    R Axis -2 degrees    T Axis 34 degrees    Diagnosis Line Normal sinus rhythm  Inferior infarct , age undetermined  Abnormal ECG    - TTE :  < from: TTE Echo Complete w/o Contrast w/ Doppler (11.26.22 @ 13:48) >    Summary:   1. Normal global left ventricular systolic function.   2. LV Ejection Fraction by Oneill's Method with a biplane EF of 59 %.   3. Spectral Doppler shows impaired relaxation pattern of left   ventricular myocardial filling (Grade I diastolic dysfunction).   4. Normal left atrial size.   5. Normal right atrial size.   6. Small pericardial effusion.   7. Mild mitral valve regurgitation.   8. Mild tricuspid regurgitation.   9. Mild aortic regurgitation.  10. Mild pulmonic valve regurgitation.    - Chest x-ray:  `< from: Xray Chest 1 View-PORTABLE IMMEDIATE (Xray Chest 1 View-PORTABLE IMMEDIATE .) (11.26.22 @ 10:23) >    INTERPRETATION:  Clinical History / Reason for exam: STEMI    Comparison : Chest radiograph None.    Technique/Positioning: Frontal.    Findings:    Support devices: None.    Cardiac/mediastinum/hilum: Heart size within normal limits, thoracic   aortic calcification.    Lung parenchyma/Pleura: Within normal limits.    Skeleton/soft tissues: Unremarkable.    Impression:    No radiographic evidence of acute cardiopulmonary disease.      -CT Angio  < from: CT Angio Chest PE Protocol w/ IV Cont (11.26.22 @ 14:46) >      IMPRESSION:  No evidence of acute intrathoracic or pulmonary embolism.    - LE duplex  < from: VA Duplex Lower Ext Vein Scan, Bilat (11.26.22 @ 14:53) >    INTERPRETATION:  CLINICAL INFORMATION: The patient is a 72-year-old   female with lower surgery swelling. A venous duplex examination was   performed to evaluate the patient for deep venous thrombosis of the lower   extremities.    The common femoral, great saphenous, femoral, popliteal and small   saphenous veins were visualized bilaterally with no evidence of deep   venous thrombosis    All veins were fully compressible.  There was presence of spontaneous   flow, augmentation with distal compression and phasicity.    The anterior tibial veins were  patent    The posterior tibial veins were  patent    The peroneal veins were patent.    Impression:    No evidence of deep venous thrombosis or superficial thrombophlebitis in   the bilateral lower extremities.        - Labs:                        12.0   5.36  )-----------( 310      ( 29 Nov 2022 05:30 )             36.2     11-29    140  |  106  |  13  ----------------------------<  90  4.2   |  23  |  0.6<L>    Ca    8.6      29 Nov 2022 05:30  Mg     2.1     11-29        Troponin T, Serum: <0.01 ng/mL (11-26-22 @ 19:56)  Troponin T, Serum: <0.01 ng/mL (11-26-22 @ 08:37)    Serum Pro-Brain Natriuretic Peptide: 882 pg/mL (11-26)      Triglycerides, Serum: 76 mg/dL (11-26-22 @ 19:56)  LDL Cholesterol Calculated: 71 mg/dL (11-26-22 @ 19:56)    Thyroid Stimulating Hormone, Serum: 0.68 uIU/mL (11-26-22 @ 19:56)        Medications:  chlorhexidine 2% Cloths 1 Application(s) Topical daily  colchicine 0.6 milliGRAM(s) Oral two times a day  enoxaparin Injectable 40 milliGRAM(s) SubCutaneous every 24 hours  ibuprofen  Tablet. 600 milliGRAM(s) Oral every 8 hours  metoprolol tartrate 50 milliGRAM(s) Oral every 8 hours    Drips:    PRN:     Allergies    penicillin (Hives; Rash)    Intolerances

## 2022-11-29 NOTE — DISCHARGE NOTE PROVIDER - PROVIDER TOKENS
PROVIDER:[TOKEN:[[id number]],SCHEDULEDAPPT:[12/09/2022],SCHEDULEDAPPTTIME:[03:30 PM]] PROVIDER:[TOKEN:[[id number]],SCHEDULEDAPPT:[12/09/2022],SCHEDULEDAPPTTIME:[03:30 PM]],PROVIDER:[TOKEN:[[id number]],SCHEDULEDAPPT:[12/06/2022],SCHEDULEDAPPTTIME:[02:30 PM]],PROVIDER:[TOKEN:[[id number]],SCHEDULEDAPPT:[12/07/2022],SCHEDULEDAPPTTIME:[01:20 PM]]

## 2022-11-29 NOTE — DISCHARGE NOTE PROVIDER - CARE PROVIDERS DIRECT ADDRESSES
[email or web address]

## 2022-11-29 NOTE — DISCHARGE NOTE PROVIDER - NSDCCPCAREPLAN_GEN_ALL_CORE_FT
PRINCIPAL DISCHARGE DIAGNOSIS  Diagnosis: Pericarditis  Assessment and Plan of Treatment:       SECONDARY DISCHARGE DIAGNOSES  Diagnosis: Chest pain  Assessment and Plan of Treatment:     Diagnosis: Cough  Assessment and Plan of Treatment:     Diagnosis: Respiratory syncytial virus (RSV)  Assessment and Plan of Treatment:     Diagnosis: Paroxysmal atrial fibrillation  Assessment and Plan of Treatment:      PRINCIPAL DISCHARGE DIAGNOSIS  Diagnosis: Pericarditis  Assessment and Plan of Treatment: Please continue colchicine 0.6mg two times a day for three months.   Please continue ibuprofen 600mg every 8 hours for two weeks, then decrease to 400mg every 8 hours for two weeks, then 200mg every 8 hours for two weeks.        SECONDARY DISCHARGE DIAGNOSES  Diagnosis: Chest pain  Assessment and Plan of Treatment:     Diagnosis: Cough  Assessment and Plan of Treatment:     Diagnosis: Respiratory syncytial virus (RSV)  Assessment and Plan of Treatment:     Diagnosis: Paroxysmal atrial fibrillation  Assessment and Plan of Treatment: Please continue to take your metoprolol tartrate 50 mg every 8 hours

## 2022-11-29 NOTE — DISCHARGE NOTE PROVIDER - NSDCMRMEDTOKEN_GEN_ALL_CORE_FT
Tylenol 500 mg oral tablet: 2 tab(s) orally every 6 hours   Colcrys 0.6 mg oral tablet: 1 tab(s) orally 2 times a day   mg oral tablet: 1 tab(s) orally every 8 hours for two weeks; then 200 mg every 8 hours for two weeks.   mg oral tablet: 1 tab(s) orally every 8 hours for two weeks; then 400 mg every 8 hours for two weeks; then 200 mg every 8 hours for two weeks  ibuprofen 200 mg oral tablet: 1 tab(s) orally 3 times a day for two weeks.  Lopressor 50 mg oral tablet: 1 tab(s) orally every 8 hours

## 2022-12-02 DIAGNOSIS — B33.23 VIRAL PERICARDITIS: ICD-10-CM

## 2022-12-02 DIAGNOSIS — Z20.822 CONTACT WITH AND (SUSPECTED) EXPOSURE TO COVID-19: ICD-10-CM

## 2022-12-02 DIAGNOSIS — Z79.899 OTHER LONG TERM (CURRENT) DRUG THERAPY: ICD-10-CM

## 2022-12-02 DIAGNOSIS — I48.92 UNSPECIFIED ATRIAL FLUTTER: ICD-10-CM

## 2022-12-02 DIAGNOSIS — Z88.0 ALLERGY STATUS TO PENICILLIN: ICD-10-CM

## 2022-12-02 DIAGNOSIS — B97.4 RESPIRATORY SYNCYTIAL VIRUS AS THE CAUSE OF DISEASES CLASSIFIED ELSEWHERE: ICD-10-CM

## 2022-12-02 DIAGNOSIS — I48.0 PAROXYSMAL ATRIAL FIBRILLATION: ICD-10-CM

## 2022-12-02 DIAGNOSIS — I08.8 OTHER RHEUMATIC MULTIPLE VALVE DISEASES: ICD-10-CM

## 2022-12-02 LAB
CULTURE RESULTS: SIGNIFICANT CHANGE UP
CULTURE RESULTS: SIGNIFICANT CHANGE UP
SPECIMEN SOURCE: SIGNIFICANT CHANGE UP
SPECIMEN SOURCE: SIGNIFICANT CHANGE UP

## 2022-12-06 ENCOUNTER — APPOINTMENT (OUTPATIENT)
Dept: CARDIOLOGY | Facility: CLINIC | Age: 72
End: 2022-12-06

## 2022-12-06 VITALS — HEART RATE: 55 BPM | TEMPERATURE: 97.6 F | SYSTOLIC BLOOD PRESSURE: 120 MMHG | DIASTOLIC BLOOD PRESSURE: 80 MMHG

## 2022-12-06 VITALS — HEIGHT: 62 IN | WEIGHT: 144 LBS | BODY MASS INDEX: 26.5 KG/M2 | HEART RATE: 97 BPM

## 2022-12-06 DIAGNOSIS — R06.83 SNORING: ICD-10-CM

## 2022-12-06 PROCEDURE — 93000 ELECTROCARDIOGRAM COMPLETE: CPT

## 2022-12-06 PROCEDURE — 99214 OFFICE O/P EST MOD 30 MIN: CPT

## 2022-12-06 NOTE — CARDIOLOGY SUMMARY
[de-identified] : EKG 12/6/22 sinus bradycardia 55 bpm  [de-identified] : TTE 11/26/22 normal global LV sys function EF 59%, G1DD, small pericardial effusion, mild MR, mild TR, mild AI, mild PI

## 2022-12-06 NOTE — PHYSICAL EXAM
[Well Developed] : well developed [Well Nourished] : well nourished [No Acute Distress] : no acute distress [No Carotid Bruit] : no carotid bruit [Normal S1, S2] : normal S1, S2 [No Murmur] : no murmur [No Rub] : no rub [No Gallop] : no gallop [Clear Lung Fields] : clear lung fields [Good Air Entry] : good air entry [No Respiratory Distress] : no respiratory distress  [Moves all extremities] : moves all extremities [No Focal Deficits] : no focal deficits [Normal Speech] : normal speech [No edema] : no edema [No varicosities] : no varicosities [No chronic venous stasis changes] : no chronic venous stasis changes [No rashes] : no rashes [de-identified] : no rub

## 2022-12-06 NOTE — ASSESSMENT
[FreeTextEntry1] : Assessment:\par #Acute viral pericarditis, diagnosed 11/26/22\par #New onset paroxysmal AFIb/Flutter\par - CHADVASC2, no a/c indicated given low CHASCVASC and high risk of bleeding with pericarditis/NSAID treatment\par #RSV infection - improving\par #Snoring\par \par Plan:\par - Echo and labs (ESR/CRP) in 4 weeks)\par - Continue Ibuprofen with the following taper:\par    -600 mg q8H 2 weeks, reduce to 400 on 12/11\par    -400 mg q8 x2 weeks, reduce to 200 on 12/25\par    -200 mg q8 x2 weeks,  DC ibuprofen 1/9/22\par - Continue colchicine 0.6 mg BID x3 months\par - Metop tartrate 50 mg q8H\par - Follow-up with Dr. Staley 12/9/22\par - Pulmonary medicine referral for ABELINO evaluation \par - Return to clinic in 4-6 weeks

## 2022-12-06 NOTE — HISTORY OF PRESENT ILLNESS
[FreeTextEntry1] : 72F with recent admission for chest pain, diagnosed with pericarditis and paroxysmal AFib here for hospital follow-up.  \par \par Here with daughter Muna\par \par Feels good in the morning, fatigued by afternoon. No chest pain, palpitations. BOWMAN with stairs. No fevers, chills. Lingering cough.\par \par Compliant with medications. Overwhelmed by hospital admission, medications and need to follow-up with physicians. \par \par 11/26/22 ESR 90, Cr 0.6, trop negative x2, procal 0.16, pro bnp 882, .7

## 2022-12-06 NOTE — REVIEW OF SYSTEMS
[Feeling Fatigued] : feeling fatigued [SOB] : no shortness of breath [Dyspnea on exertion] : dyspnea during exertion [Chest Discomfort] : no chest discomfort [Lower Ext Edema] : no extremity edema [Leg Claudication] : no intermittent leg claudication [Palpitations] : no palpitations [Orthopnea] : no orthopnea [PND] : no PND [Syncope] : no syncope [Abdominal Pain] : no abdominal pain [Heartburn] : no heartburn [Dizziness] : no dizziness [Anxiety] : anxiety

## 2022-12-09 ENCOUNTER — APPOINTMENT (OUTPATIENT)
Dept: CARDIOLOGY | Facility: CLINIC | Age: 72
End: 2022-12-09

## 2022-12-09 VITALS
DIASTOLIC BLOOD PRESSURE: 70 MMHG | TEMPERATURE: 97.1 F | WEIGHT: 145 LBS | SYSTOLIC BLOOD PRESSURE: 120 MMHG | HEIGHT: 62 IN | HEART RATE: 53 BPM | RESPIRATION RATE: 16 BRPM | BODY MASS INDEX: 26.68 KG/M2

## 2022-12-09 PROCEDURE — 99214 OFFICE O/P EST MOD 30 MIN: CPT

## 2022-12-09 PROCEDURE — 93000 ELECTROCARDIOGRAM COMPLETE: CPT

## 2022-12-22 NOTE — HISTORY OF PRESENT ILLNESS
[FreeTextEntry1] : New patient. Patient is a very pleasant 72 year old woman with no major past medical history. Recently underwent upper respiratory tract infection and found to have pericarditis. Pericarditis was complicated by episodes of paroxysmal atrial fibrillation and patient was discharged home on  Colchicine, Metoprolol, and Ibuprofen. Patient currently comes to the office for further evaluation for her atrial fibrillation. Currently, patient is in normal sinus rhythm and doing well, pericarditis is resolving with the medications. She has significant improvement in her symptoms, still has some slight chest pressure. \par \par EKG (12/09/2022) sinus rhythm 53 bpm.\par Echo (11/26/2022) ejection fraction of 59%, normal left atrial size, no major valvular disease.

## 2022-12-22 NOTE — ASSESSMENT
[FreeTextEntry1] : Paroxysmal Atrial Fibrillation \par \par  - CHADSVASC of 1. \par - At this time given history of pericarditis and low CHADSVASC score, I recommend no anticoagulation at this moment. \par - I would like to further evaluate if patient had previous episodes of atrial fibrillation or future episodes of atrial fibrillation.\par - Discussed with patient and daughter different options for monitoring atrial fibrillation. I have offered loop recorder and event monitor. Patient would like to proceed first with event monitor for 30 days to see if there are any further episodes of atrial fibrillation.\par \par Pericarditis \par - Continue Colchicine 0.6 mg twice a day as directed.

## 2022-12-22 NOTE — ADDENDUM
[FreeTextEntry1] : IAwilda assisted in documentation on 12/12/2022  acting as a scribe for Dr. Abhishek Staley.\par

## 2022-12-29 ENCOUNTER — APPOINTMENT (OUTPATIENT)
Dept: CARDIOLOGY | Facility: CLINIC | Age: 72
End: 2022-12-29
Payer: MEDICARE

## 2022-12-29 PROCEDURE — 93306 TTE W/DOPPLER COMPLETE: CPT

## 2023-01-03 ENCOUNTER — APPOINTMENT (OUTPATIENT)
Dept: CARDIOLOGY | Facility: CLINIC | Age: 73
End: 2023-01-03
Payer: MEDICARE

## 2023-01-03 VITALS — OXYGEN SATURATION: 97 % | SYSTOLIC BLOOD PRESSURE: 128 MMHG | DIASTOLIC BLOOD PRESSURE: 76 MMHG | HEART RATE: 76 BPM

## 2023-01-03 VITALS — WEIGHT: 139 LBS | HEIGHT: 62 IN | BODY MASS INDEX: 25.58 KG/M2

## 2023-01-03 PROCEDURE — 99214 OFFICE O/P EST MOD 30 MIN: CPT

## 2023-01-03 NOTE — CARDIOLOGY SUMMARY
[de-identified] : EKG 12/6/22 sinus bradycardia 55 bpm  [de-identified] : TTE 11/26/22 normal global LV sys function EF 59%, G1DD, small pericardial effusion, mild MR, mild TR, mild AI, mild PI\par TTE 12/29/22 normal LV function, G1 DD, trivial pericardial effusion, mild MR, mild to mod TR, mild AI, mild PI, ao 3.8 cm at sinuses of valsalva

## 2023-01-03 NOTE — REVIEW OF SYSTEMS
[Feeling Fatigued] : feeling fatigued [Anxiety] : anxiety [SOB] : no shortness of breath [Dyspnea on exertion] : not dyspnea during exertion [Chest Discomfort] : no chest discomfort [Lower Ext Edema] : no extremity edema [Leg Claudication] : no intermittent leg claudication [Palpitations] : no palpitations [Orthopnea] : no orthopnea [PND] : no PND [Syncope] : no syncope [Abdominal Pain] : no abdominal pain [Heartburn] : no heartburn [Dizziness] : no dizziness

## 2023-01-03 NOTE — HISTORY OF PRESENT ILLNESS
[FreeTextEntry1] : 72F with recent admission for chest pain, diagnosed with pericarditis and paroxysmal AFib here for follow-up.  \par \par 1/3/23\par \par COVID infection week before [place]. No hospitalization\par \par Feels better \par \par No chest pain, shortness of breath, palpitations, lightheadedness/dizziness, pre-syncope/syncope, or lower extremity edema. \par \par Compliant with medications\par \par 12/6/22\par Here with daughter Muna\par \par Feels good in the morning, fatigued by afternoon. No chest pain, palpitations. BOWMAN with stairs. No fevers, chills. Lingering cough.\par \par Compliant with medications. Overwhelmed by hospital admission, medications and need to follow-up with physicians. \par \par 11/26/22 ESR 90, Cr 0.6, trop negative x2, procal 0.16, pro bnp 882, .7

## 2023-01-03 NOTE — PHYSICAL EXAM
[Well Developed] : well developed [Well Nourished] : well nourished [No Acute Distress] : no acute distress [No Carotid Bruit] : no carotid bruit [Normal S1, S2] : normal S1, S2 [No Murmur] : no murmur [No Rub] : no rub [No Gallop] : no gallop [Clear Lung Fields] : clear lung fields [Good Air Entry] : good air entry [No Respiratory Distress] : no respiratory distress  [Moves all extremities] : moves all extremities [No Focal Deficits] : no focal deficits [Normal Speech] : normal speech [No edema] : no edema [No varicosities] : no varicosities [No chronic venous stasis changes] : no chronic venous stasis changes [No rashes] : no rashes [de-identified] : no rub

## 2023-01-03 NOTE — ASSESSMENT
[FreeTextEntry1] : Assessment:\par #Acute viral pericarditis, diagnosed 11/26/22\par - 11/26/22 ESR 90, Cr 0.6, trop negative x2, procal 0.16, pro bnp 882, .7\par - ESR now normal at 17\par - CRP going down, now 13\par - Repeat echo 12/29/22 with trivial effusion\par #New onset paroxysmal AFIb/Flutter\par - CHADVASC2 1, no a/c indicated given low CHASCVASC and high risk of bleeding with pericarditis/NSAID treatment\par #Snoring\par \par \par Plan:\par - Limited Echo to assess for effusion and labs (ESR/CRP) mid March after completing therapy\par - Continue Ibuprofen with the following taper:\par    -200 mg q8 x2 weeks,  DC ibuprofen 1/9/22\par - Continue colchicine 0.6 mg BID x3 months\par - Metop tartrate 25 mg BID, f/u results of Holter, can likely discharge\par - Follow-up with Dr. Staley \par - Pulmonary medicine referral for ABELINO evaluation \par - Return to clinic end of March

## 2023-01-18 ENCOUNTER — NON-APPOINTMENT (OUTPATIENT)
Age: 73
End: 2023-01-18

## 2023-01-27 ENCOUNTER — TRANSCRIPTION ENCOUNTER (OUTPATIENT)
Age: 73
End: 2023-01-27

## 2023-01-27 ENCOUNTER — APPOINTMENT (OUTPATIENT)
Dept: ELECTROPHYSIOLOGY | Facility: CLINIC | Age: 73
End: 2023-01-27
Payer: MEDICARE

## 2023-01-27 VITALS
WEIGHT: 140 LBS | TEMPERATURE: 98 F | BODY MASS INDEX: 25.76 KG/M2 | DIASTOLIC BLOOD PRESSURE: 60 MMHG | SYSTOLIC BLOOD PRESSURE: 120 MMHG | HEART RATE: 74 BPM | HEIGHT: 62 IN | RESPIRATION RATE: 16 BRPM

## 2023-01-27 PROCEDURE — 99214 OFFICE O/P EST MOD 30 MIN: CPT

## 2023-01-27 PROCEDURE — 93000 ELECTROCARDIOGRAM COMPLETE: CPT

## 2023-02-19 NOTE — ASSESSMENT
[FreeTextEntry1] : Paroxysmal Atrial Fibrillation \par \par - CHADVASC of 1\par - no evidence of atrial fibrillation after Pericarditis on the monitor that patient wore for 30 days. Patient denies any palpitations or signs of reoccurrence of atrial fibrillation. \par - Therefore, given CHADVASC of 1, we recommend no anticoagulation medication at this time. \par \par \par Pericarditis - feels well \par - stop Colchicine 0.6 mg twice a day as directed. \par - Agree with repeating Echo\par - continue to follow up with cardiology \par

## 2023-02-19 NOTE — ADDENDUM
[FreeTextEntry1] : IAwilda assisted in documentation on 12/12/2022  acting as a scribe for Dr. Abhishek Staley.\par \par Ayana SADLER assisted in documentation on 01/27/2023  acting as a scribe for Abhishek Burgess.\par

## 2023-02-19 NOTE — CARDIOLOGY SUMMARY
[de-identified] : The event monitor (1/2023) showed an average heart rate of 73 bpm, no abnormal heart rhythms, and no evidence of atrial fibrillation.

## 2023-02-19 NOTE — HISTORY OF PRESENT ILLNESS
[FreeTextEntry1] : New patient. Patient is a very pleasant 72 year old woman with no major past medical history. Recently underwent upper respiratory tract infection and found to have pericarditis. Pericarditis was complicated by episodes of paroxysmal atrial fibrillation and patient was discharged home on  Colchicine, Metoprolol, and Ibuprofen. Patient currently comes to the office for further evaluation for her atrial fibrillation. Currently, patient is in normal sinus rhythm and doing well, pericarditis is resolving with the medications. She has significant improvement in her symptoms, still has some slight chest pressure. \par \par Patient is feeling great. No pain, syncope, or chest pain. \par \par \par EKG (12/09/2022) sinus rhythm 53 bpm.\par Echo (11/26/2022) ejection fraction of 59%, normal left atrial size, no major valvular disease. \par \par EKG (1/27/23) Sinus Rhythm 74 bpm \par The event monitor (1/2023) showed an average heart rate of 73 bpm, no abnormal heart rhythms, and no evidence of atrial fibrillation. \par

## 2023-02-28 ENCOUNTER — APPOINTMENT (OUTPATIENT)
Dept: CARDIOLOGY | Facility: CLINIC | Age: 73
End: 2023-02-28
Payer: COMMERCIAL

## 2023-02-28 PROCEDURE — 99441: CPT

## 2023-03-06 ENCOUNTER — APPOINTMENT (OUTPATIENT)
Dept: CARDIOLOGY | Facility: CLINIC | Age: 73
End: 2023-03-06
Payer: MEDICARE

## 2023-03-06 PROCEDURE — 93306 TTE W/DOPPLER COMPLETE: CPT

## 2023-03-09 ENCOUNTER — APPOINTMENT (OUTPATIENT)
Dept: CARDIOLOGY | Facility: CLINIC | Age: 73
End: 2023-03-09
Payer: MEDICARE

## 2023-03-09 ENCOUNTER — RX CHANGE (OUTPATIENT)
Age: 73
End: 2023-03-09

## 2023-03-09 VITALS
OXYGEN SATURATION: 98 % | DIASTOLIC BLOOD PRESSURE: 70 MMHG | WEIGHT: 135.8 LBS | BODY MASS INDEX: 24.99 KG/M2 | SYSTOLIC BLOOD PRESSURE: 124 MMHG | HEART RATE: 67 BPM | HEIGHT: 62 IN

## 2023-03-09 PROCEDURE — 99214 OFFICE O/P EST MOD 30 MIN: CPT

## 2023-03-09 PROCEDURE — 93000 ELECTROCARDIOGRAM COMPLETE: CPT

## 2023-03-09 RX ORDER — METOPROLOL TARTRATE 25 MG/1
25 TABLET, FILM COATED ORAL
Qty: 180 | Refills: 1 | Status: DISCONTINUED | COMMUNITY
Start: 2023-03-09 | End: 2023-03-09

## 2023-03-10 NOTE — ASSESSMENT
[FreeTextEntry1] : Assessment:\par #Acute viral pericarditis, diagnosed 11/26/22\par - s/p treatment with ibuprofen and colchicine x3 months\par - 11/26/22 ESR 90, Cr 0.6, trop negative x2, procal 0.16, pro bnp 882, .7\par - 3/6/23 ESR now normal at 17, CRP increased from 13 to 23\par - Repeat echo 3/6/23 with no pericardial effusion\par #New onset paroxysmal AFIb/Flutter\par - CHADVASC2 1, no a/c indicated given low CHASCVASC and high risk of bleeding with pericarditis/NSAID treatment\par #Snoring\par \par \par Plan:\par - Reduce Metop tartrate from 25 mg BID to 12.5 mg BID, can discontinue at next visit\par - Encouraged patient to f/u with PCP regarding elevated CRP\par - Repeat labs prior to next visit\par - Return to clinic in 2 months\par \par I, Dr. Cutler, personally performed the evaluation and management (E/M) services for this established patient who presents today with (a) new problem(s)/exacerbation of (an) existing condition(s). That E/M includes conducting the clinically appropriate interval history &/or exam, assessing all new/exacerbated conditions, and establishing a new plan of care. Today, SADIQ Wilkinson was here to observe &/or participate in the visit & follow plan of care established by me. \par \par \par \par \par \par

## 2023-03-10 NOTE — PHYSICAL EXAM
[Well Developed] : well developed [Well Nourished] : well nourished [No Acute Distress] : no acute distress [No Carotid Bruit] : no carotid bruit [Normal S1, S2] : normal S1, S2 [No Murmur] : no murmur [No Rub] : no rub [No Gallop] : no gallop [Clear Lung Fields] : clear lung fields [Good Air Entry] : good air entry [No Respiratory Distress] : no respiratory distress  [Moves all extremities] : moves all extremities [No Focal Deficits] : no focal deficits [Normal Speech] : normal speech [No edema] : no edema [No varicosities] : no varicosities [No chronic venous stasis changes] : no chronic venous stasis changes [No rashes] : no rashes [de-identified] : no rub

## 2023-03-10 NOTE — HISTORY OF PRESENT ILLNESS
[FreeTextEntry1] : 72F with recent admission for chest pain, diagnosed with pericarditis and paroxysmal AFib here for follow-up.  \par \par 03/09/23 Pt is seen for f.u. Pt denies any SOB, no chest pain, no palpitations. \par \par TTE 03/07/23 EF 67% RA dilated, Mild to Mod TR , prox ascending Aorta is mildly dilated. \par 03/06/23 C Reactive protein 22.9 \par \par S/p F/U with Dr Staley, event monitor showed no A fib, \par \par 1/3/23\par COVID infection week before [place]. No hospitalization\par \par Feels better \par \par No chest pain, shortness of breath, palpitations, lightheadedness/dizziness, pre-syncope/syncope, or lower extremity edema. \par \par Compliant with medications\par \par 12/6/22\par Here with daughter Muna\par \par Feels good in the morning, fatigued by afternoon. No chest pain, palpitations. BOWMAN with stairs. No fevers, chills. Lingering cough.\par \par Compliant with medications. Overwhelmed by hospital admission, medications and need to follow-up with physicians. \par \par 11/26/22 ESR 90, Cr 0.6, trop negative x2, procal 0.16, pro bnp 882, .7

## 2023-03-10 NOTE — REVIEW OF SYSTEMS
[Anxiety] : anxiety [Feeling Fatigued] : not feeling fatigued [SOB] : no shortness of breath [Dyspnea on exertion] : not dyspnea during exertion [Chest Discomfort] : no chest discomfort [Lower Ext Edema] : no extremity edema [Leg Claudication] : no intermittent leg claudication [Palpitations] : no palpitations [Orthopnea] : no orthopnea [PND] : no PND [Syncope] : no syncope [Abdominal Pain] : no abdominal pain [Heartburn] : no heartburn [Dizziness] : no dizziness

## 2023-03-10 NOTE — CARDIOLOGY SUMMARY
[de-identified] : EKG 12/6/22 sinus bradycardia 55 bpm \par EKG 3/9/23 Normal sinus rhythm LAE, inferior infarct  [de-identified] : TTE 11/26/22 normal global LV sys function EF 59%, G1DD, small pericardial effusion, mild MR, mild TR, mild AI, mild PI\par TTE 12/29/22 normal LV function, G1 DD, trivial pericardial effusion, mild MR, mild to mod TR, mild AI, mild PI, ao 3.8 cm at sinuses of valsalva

## 2023-04-26 ENCOUNTER — OUTPATIENT (OUTPATIENT)
Dept: INPATIENT UNIT | Facility: HOSPITAL | Age: 73
LOS: 1 days | Discharge: HOME | End: 2023-04-26
Payer: MEDICARE

## 2023-04-26 VITALS
HEART RATE: 75 BPM | TEMPERATURE: 96 F | SYSTOLIC BLOOD PRESSURE: 138 MMHG | DIASTOLIC BLOOD PRESSURE: 77 MMHG | HEIGHT: 62 IN | OXYGEN SATURATION: 99 % | RESPIRATION RATE: 17 BRPM | WEIGHT: 134.92 LBS

## 2023-04-26 VITALS — HEART RATE: 69 BPM | DIASTOLIC BLOOD PRESSURE: 66 MMHG | SYSTOLIC BLOOD PRESSURE: 128 MMHG | RESPIRATION RATE: 17 BRPM

## 2023-04-26 DIAGNOSIS — H26.9 UNSPECIFIED CATARACT: ICD-10-CM

## 2023-04-26 DIAGNOSIS — I48.91 UNSPECIFIED ATRIAL FIBRILLATION: ICD-10-CM

## 2023-04-26 DIAGNOSIS — H52.4 PRESBYOPIA: ICD-10-CM

## 2023-04-26 DIAGNOSIS — H25.811 COMBINED FORMS OF AGE-RELATED CATARACT, RIGHT EYE: ICD-10-CM

## 2023-04-26 DIAGNOSIS — Z88.0 ALLERGY STATUS TO PENICILLIN: ICD-10-CM

## 2023-04-26 DIAGNOSIS — Z98.49 CATARACT EXTRACTION STATUS, UNSPECIFIED EYE: Chronic | ICD-10-CM

## 2023-04-26 PROCEDURE — V2788: CPT | Mod: GY

## 2023-04-26 PROCEDURE — V2632: CPT

## 2023-04-26 NOTE — ASU PATIENT PROFILE, ADULT - NSICDXPASTSURGICALHX_GEN_ALL_CORE_FT
PAST SURGICAL HISTORY:   delivery delivered x 3     PAST SURGICAL HISTORY:   delivery delivered x 3    S/P cataract surgery left eye 2020

## 2023-05-09 ENCOUNTER — APPOINTMENT (OUTPATIENT)
Dept: CARDIOLOGY | Facility: CLINIC | Age: 73
End: 2023-05-09
Payer: MEDICARE

## 2023-05-09 VITALS
BODY MASS INDEX: 24.11 KG/M2 | WEIGHT: 131 LBS | HEIGHT: 62 IN | HEART RATE: 65 BPM | SYSTOLIC BLOOD PRESSURE: 114 MMHG | DIASTOLIC BLOOD PRESSURE: 72 MMHG | OXYGEN SATURATION: 94 %

## 2023-05-09 PROBLEM — Z86.79 PERSONAL HISTORY OF OTHER DISEASES OF THE CIRCULATORY SYSTEM: Chronic | Status: ACTIVE | Noted: 2023-04-26

## 2023-05-09 PROBLEM — I48.91 UNSPECIFIED ATRIAL FIBRILLATION: Chronic | Status: ACTIVE | Noted: 2023-04-26

## 2023-05-09 PROCEDURE — 99214 OFFICE O/P EST MOD 30 MIN: CPT

## 2023-05-09 RX ORDER — METOPROLOL TARTRATE 25 MG/1
25 TABLET, FILM COATED ORAL
Qty: 180 | Refills: 1 | Status: DISCONTINUED | COMMUNITY
Start: 2023-03-09 | End: 2023-05-09

## 2023-05-09 NOTE — PHYSICAL EXAM
[Well Developed] : well developed [Well Nourished] : well nourished [No Acute Distress] : no acute distress [No Carotid Bruit] : no carotid bruit [No Murmur] : no murmur [Normal S1, S2] : normal S1, S2 [No Rub] : no rub [No Gallop] : no gallop [Clear Lung Fields] : clear lung fields [Good Air Entry] : good air entry [No Respiratory Distress] : no respiratory distress  [Moves all extremities] : moves all extremities [No Focal Deficits] : no focal deficits [Normal Speech] : normal speech [No edema] : no edema [No varicosities] : no varicosities [No chronic venous stasis changes] : no chronic venous stasis changes [No rashes] : no rashes [de-identified] : no rub

## 2023-05-09 NOTE — ASSESSMENT
[FreeTextEntry1] : Assessment:\par #Acute viral pericarditis, diagnosed 11/26/22\par - s/p treatment with ibuprofen and colchicine x3 months\par - 11/26/22 ESR 90, Cr 0.6, trop negative x2, procal 0.16, pro bnp 882, .7\par - 3/6/23 ESR now normal at 17, CRP increased from 13 to 23\par - Repeat echo 3/6/23 with no pericardial effusion\par #New onset paroxysmal AFIb/Flutter\par - CHADVASC2 1, no a/c indicated given low CHASCVASC and high risk of bleeding with pericarditis/NSAID treatment\par #Snoring\par \par 5/6/23\par Cr 0.61, K 3.8, CRP 16.4, ESR 9\par \par Plan:\par - Okay to discontinue Metop tartrate 12.5 mg BID, discussed using it PRN for palpitations\par - Encouraged patient to f/u with PCP regarding elevated CRP\par - Return to clinic in 3 months\par \par \par \par \par \par \par

## 2023-05-09 NOTE — CARDIOLOGY SUMMARY
[de-identified] : EKG 12/6/22 sinus bradycardia 55 bpm \par EKG 3/9/23 Normal sinus rhythm LAE, inferior infarct  [de-identified] : TTE 11/26/22 normal global LV sys function EF 59%, G1DD, small pericardial effusion, mild MR, mild TR, mild AI, mild PI\par TTE 12/29/22 normal LV function, G1 DD, trivial pericardial effusion, mild MR, mild to mod TR, mild AI, mild PI, ao 3.8 cm at sinuses of valsalva

## 2023-05-30 ENCOUNTER — RX RENEWAL (OUTPATIENT)
Age: 73
End: 2023-05-30

## 2023-06-20 ENCOUNTER — APPOINTMENT (OUTPATIENT)
Dept: CARDIOLOGY | Facility: CLINIC | Age: 73
End: 2023-06-20
Payer: MEDICARE

## 2023-06-20 VITALS — HEART RATE: 63 BPM | SYSTOLIC BLOOD PRESSURE: 114 MMHG | DIASTOLIC BLOOD PRESSURE: 80 MMHG

## 2023-06-20 VITALS — BODY MASS INDEX: 24.19 KG/M2 | TEMPERATURE: 98 F | WEIGHT: 131.44 LBS | HEIGHT: 62 IN

## 2023-06-20 DIAGNOSIS — R00.2 PALPITATIONS: ICD-10-CM

## 2023-06-20 PROCEDURE — 99214 OFFICE O/P EST MOD 30 MIN: CPT

## 2023-06-20 PROCEDURE — 93000 ELECTROCARDIOGRAM COMPLETE: CPT

## 2023-06-20 NOTE — PHYSICAL EXAM
[Well Developed] : well developed [Well Nourished] : well nourished [No Acute Distress] : no acute distress [No Carotid Bruit] : no carotid bruit [Normal S1, S2] : normal S1, S2 [No Murmur] : no murmur [No Rub] : no rub [No Gallop] : no gallop [Clear Lung Fields] : clear lung fields [Good Air Entry] : good air entry [No Respiratory Distress] : no respiratory distress  [Moves all extremities] : moves all extremities [No Focal Deficits] : no focal deficits [Normal Speech] : normal speech [No edema] : no edema [No varicosities] : no varicosities [No chronic venous stasis changes] : no chronic venous stasis changes [No rashes] : no rashes [de-identified] : no rub

## 2023-06-20 NOTE — CARDIOLOGY SUMMARY
[de-identified] : EKG 12/6/22 sinus bradycardia 55 bpm \par EKG 3/9/23 Normal sinus rhythm LAE, inferior infarct \par EKG 6/20/23 Normal sinus rhythm 63 bpm  [de-identified] : TTE 11/26/22 normal global LV sys function EF 59%, G1DD, small pericardial effusion, mild MR, mild TR, mild AI, mild PI\par TTE 12/29/22 normal LV function, G1 DD, trivial pericardial effusion, mild MR, mild to mod TR, mild AI, mild PI, ao 3.8 cm at sinuses of valsalva

## 2023-06-20 NOTE — HISTORY OF PRESENT ILLNESS
[FreeTextEntry1] : 72F with admission for chest pain 2022, diagnosed with pericarditis and paroxysmal AFib not on a/c here for follow-up.  \par \par 23\par Apple watch notified her of Increased HR to 102 bpm. Asymptomatic. Increased stress. Daughter getting . Dog . Cataract surgery. \par \par Resumed metoprolol 12.5 mg BID with improvement in HRs on watch. She is doing BID EKGs on her watch and all normal rhythm. No AFib/Flutter. \par \par 23\par Cataract surgery a few weeks ago\par \par Occasional intermittent pleuritic pain that resolves with belching - currently resolved \par \par No shortness of breath, palpitations, lightheadedness/dizziness, pre-syncope/syncope, or lower extremity edema. \par \par 23 Pt is seen for f.u. Pt denies any SOB, no chest pain, no palpitations. \par \par TTE 23 EF 67% RA dilated, Mild to Mod TR , prox ascending Aorta is mildly dilated. \par 23 C Reactive protein 22.9 \par \par S/p F/U with Dr Staley, event monitor showed no A fib, \par \par 1/3/23\par COVID infection week before [place]. No hospitalization\par \par Feels better \par \par No chest pain, shortness of breath, palpitations, lightheadedness/dizziness, pre-syncope/syncope, or lower extremity edema. \par \par Compliant with medications\par \par 22\par Here with daughter Muna\par \par Feels good in the morning, fatigued by afternoon. No chest pain, palpitations. BOWMAN with stairs. No fevers, chills. Lingering cough.\par \par Compliant with medications. Overwhelmed by hospital admission, medications and need to follow-up with physicians. \par \par 22 ESR 90, Cr 0.6, trop negative x2, procal 0.16, pro bnp 882, .7

## 2023-06-20 NOTE — REVIEW OF SYSTEMS
[Anxiety] : anxiety [Feeling Fatigued] : not feeling fatigued [SOB] : no shortness of breath [Dyspnea on exertion] : not dyspnea during exertion [Chest Discomfort] : no chest discomfort [Lower Ext Edema] : no extremity edema [Leg Claudication] : no intermittent leg claudication [Palpitations] : no palpitations [Orthopnea] : no orthopnea [PND] : no PND [Syncope] : no syncope [Abdominal Pain] : no abdominal pain [Heartburn] : no heartburn [Dizziness] : no dizziness [Under Stress] : under stress

## 2023-06-20 NOTE — ASSESSMENT
[FreeTextEntry1] : Assessment:\par #History of acute viral pericarditis, diagnosed 11/26/22\par - s/p treatment with ibuprofen and colchicine x3 months\par - 11/26/22 ESR 90, Cr 0.6, trop negative x2, procal 0.16, pro bnp 882, .7\par - 3/6/23 ESR now normal at 17, CRP increased from 13 to 23\par - Repeat echo 3/6/23 with no pericardial effusion\par #New onset paroxysmal AFIb/Flutter\par - CHADVASC2 1, no a/c indicated given low CHASCVASC and high risk of bleeding with pericarditis/NSAID treatment\par #Snoring\par \par 5/6/23\par Cr 0.61, K 3.8, CRP 16.4, ESR 9\par \par Plan:\par - Okay to resume Metop tartrate 12.5 mg BID, she may try to discontinue later this summer when life less stressful and use it PRN for palpitations\par - Return to clinic in 6 months or sooner PRN\par \par \par \par \par \par \par

## 2023-10-10 ENCOUNTER — APPOINTMENT (OUTPATIENT)
Dept: CARDIOLOGY | Facility: CLINIC | Age: 73
End: 2023-10-10
Payer: MEDICARE

## 2023-10-10 VITALS
WEIGHT: 136 LBS | HEART RATE: 70 BPM | DIASTOLIC BLOOD PRESSURE: 70 MMHG | BODY MASS INDEX: 25.03 KG/M2 | HEIGHT: 62 IN | SYSTOLIC BLOOD PRESSURE: 110 MMHG

## 2023-10-10 PROCEDURE — 93000 ELECTROCARDIOGRAM COMPLETE: CPT

## 2023-10-10 PROCEDURE — 99214 OFFICE O/P EST MOD 30 MIN: CPT

## 2023-11-28 ENCOUNTER — APPOINTMENT (OUTPATIENT)
Dept: CARDIOLOGY | Facility: CLINIC | Age: 73
End: 2023-11-28
Payer: MEDICARE

## 2023-11-28 VITALS
BODY MASS INDEX: 25.03 KG/M2 | SYSTOLIC BLOOD PRESSURE: 156 MMHG | WEIGHT: 136 LBS | HEIGHT: 62 IN | DIASTOLIC BLOOD PRESSURE: 80 MMHG

## 2023-11-28 PROCEDURE — 99214 OFFICE O/P EST MOD 30 MIN: CPT

## 2023-11-28 PROCEDURE — 93000 ELECTROCARDIOGRAM COMPLETE: CPT

## 2024-02-04 ENCOUNTER — NON-APPOINTMENT (OUTPATIENT)
Age: 74
End: 2024-02-04

## 2024-02-06 ENCOUNTER — APPOINTMENT (OUTPATIENT)
Dept: CARDIOLOGY | Facility: CLINIC | Age: 74
End: 2024-02-06
Payer: MEDICARE

## 2024-02-06 VITALS — SYSTOLIC BLOOD PRESSURE: 124 MMHG | DIASTOLIC BLOOD PRESSURE: 80 MMHG

## 2024-02-06 VITALS
BODY MASS INDEX: 25.76 KG/M2 | WEIGHT: 140 LBS | SYSTOLIC BLOOD PRESSURE: 142 MMHG | HEART RATE: 123 BPM | DIASTOLIC BLOOD PRESSURE: 87 MMHG | HEIGHT: 62 IN

## 2024-02-06 DIAGNOSIS — I48.0 PAROXYSMAL ATRIAL FIBRILLATION: ICD-10-CM

## 2024-02-06 DIAGNOSIS — I31.9 DISEASE OF PERICARDIUM, UNSPECIFIED: ICD-10-CM

## 2024-02-06 PROCEDURE — 93000 ELECTROCARDIOGRAM COMPLETE: CPT

## 2024-02-06 PROCEDURE — 99214 OFFICE O/P EST MOD 30 MIN: CPT

## 2024-02-06 NOTE — ASSESSMENT
[FreeTextEntry1] : Assessment: #Episode of palpitations after EtOH - HR to 170s, felt racing heartbeat but no other symptoms - Resolved after taking metoprolol and drinking water #Paroxysmal AFIb/Flutter in setting of pericarditis 11/2022 - CHADVASC2 1, no a/c indicated given low CHASCVASC and high risk of bleeding with pericarditis/NSAID treatment - Currently in sinus #History of acute viral pericarditis, diagnosed 11/26/22 - s/p treatment with ibuprofen and colchicine x3 months - 11/26/22 ESR 90, Cr 0.6, trop negative x2, procal 0.16, pro bnp 882, .7 - 3/6/23 ESR now normal at 17, CRP increased from 13 to 23 - 5/6/23 CRP 16.4, ESR 9 - Repeat echo 3/6/23 with no pericardial effusion #Snoring   Plan: -Continue with ibuprofen 600mg PO BID.  -Check TTE and inflammatory markers.  - Continue Metop tartrate 12.5 mg BID - Avoid triggers (alcohol and caffeine), increase water intake - If palpitations becoming more frequent she will notify me. We discussed that we would pursue holter vs ILR to determine AFib burden and assess for other arrythmias. We reviewed other pharmacologic options (pill in the pocket) for paroxysmal AFib - Return to clinic in 4/2024 with Dr. Gaffney.

## 2024-02-06 NOTE — REVIEW OF SYSTEMS
[Anxiety] : anxiety [Under Stress] : under stress [Feeling Fatigued] : not feeling fatigued [SOB] : no shortness of breath [Dyspnea on exertion] : not dyspnea during exertion [Chest Discomfort] : chest discomfort [Lower Ext Edema] : no extremity edema [Leg Claudication] : no intermittent leg claudication [Palpitations] : no palpitations [Orthopnea] : no orthopnea [PND] : no PND [Syncope] : no syncope [Abdominal Pain] : no abdominal pain [Heartburn] : no heartburn [Dizziness] : no dizziness

## 2024-02-06 NOTE — PHYSICAL EXAM
[Well Developed] : well developed [Well Nourished] : well nourished [No Acute Distress] : no acute distress [No Carotid Bruit] : no carotid bruit [Normal S1, S2] : normal S1, S2 [No Murmur] : no murmur [No Rub] : no rub [No Gallop] : no gallop [Clear Lung Fields] : clear lung fields [Good Air Entry] : good air entry [No Respiratory Distress] : no respiratory distress  [Moves all extremities] : moves all extremities [No Focal Deficits] : no focal deficits [Normal Speech] : normal speech [No edema] : no edema [No varicosities] : no varicosities [No chronic venous stasis changes] : no chronic venous stasis changes [No rashes] : no rashes [de-identified] : no rub

## 2024-02-06 NOTE — HISTORY OF PRESENT ILLNESS
[FreeTextEntry1] : 73F with admission for chest pain 2022, diagnosed with pericarditis and paroxysmal AFib not on a/c here for follow-up.    24-Patient had cough with some CP recently. Her son is PA and checked vitals and all was normal. She went to urgent care and was told to go get further work up. She started taking Ibuprofen 600mg PO BID which has been helping.   23 Thanksgiving night had 2 cocktails with dinner, went to bed and woke up with palpitations. Felt heart racing. No lightheadedness/dizziness, pre-syncope/syncope, chest pain. Apple watch showed HR of 170. She took metoprolol tartrate 25 mg daily and ASA. Drank water and her HR improved. She then increased metop tartrate from her usual 12.5 mg BID to 25 mg BID for 2 days. On third day HR down to 48 bpm and she went back to metop tar 12.5 mg BID. This is the first episode of palpitations since 2022 when admitted for pAFib and pericarditis.   10/10/23 Feels well. No chest pain, shortness of breath, palpitations, lightheadedness/dizziness, pre-syncope/syncope, or lower extremity edema.  23 Apple watch notified her of Increased HR to 102 bpm. Asymptomatic. Increased stress. Daughter getting . Dog . Cataract surgery.   Resumed metoprolol 12.5 mg BID with improvement in HRs on watch. She is doing BID EKGs on her watch and all normal rhythm. No AFib/Flutter.   23 Cataract surgery a few weeks ago  Occasional intermittent pleuritic pain that resolves with belching - currently resolved   No shortness of breath, palpitations, lightheadedness/dizziness, pre-syncope/syncope, or lower extremity edema.   23 Pt is seen for f.u. Pt denies any SOB, no chest pain, no palpitations.   TTE 23 EF 67% RA dilated, Mild to Mod TR , prox ascending Aorta is mildly dilated.  23 C Reactive protein 22.9   S/p F/U with Dr Staley, event monitor showed no A fib,   1/3/23 COVID infection week before [place]. No hospitalization  Feels better   No chest pain, shortness of breath, palpitations, lightheadedness/dizziness, pre-syncope/syncope, or lower extremity edema.   Compliant with medications  22 Here with daughter Muna  Feels good in the morning, fatigued by afternoon. No chest pain, palpitations. BOWMAN with stairs. No fevers, chills. Lingering cough.  Compliant with medications. Overwhelmed by hospital admission, medications and need to follow-up with physicians.   22 ESR 90, Cr 0.6, trop negative x2, procal 0.16, pro bnp 882, .7

## 2024-02-06 NOTE — CARDIOLOGY SUMMARY
[de-identified] : EKG 12/6/22 sinus bradycardia 55 bpm  EKG 3/9/23 Normal sinus rhythm LAE, inferior infarct  EKG 6/20/23 Normal sinus rhythm 63 bpm  EKG 10/10/23 Normal sinus rhythm 60 bpm  EKG 11/28/23 Normal sinus rhythm 71 bpm  [de-identified] : TTE 11/26/22 normal global LV sys function EF 59%, G1DD, small pericardial effusion, mild MR, mild TR, mild AI, mild PI\par  TTE 12/29/22 normal LV function, G1 DD, trivial pericardial effusion, mild MR, mild to mod TR, mild AI, mild PI, ao 3.8 cm at sinuses of valsalva

## 2024-02-12 ENCOUNTER — APPOINTMENT (OUTPATIENT)
Dept: CARDIOLOGY | Facility: CLINIC | Age: 74
End: 2024-02-12
Payer: MEDICARE

## 2024-02-12 PROCEDURE — 93306 TTE W/DOPPLER COMPLETE: CPT

## 2024-04-03 ENCOUNTER — APPOINTMENT (OUTPATIENT)
Dept: CARDIOLOGY | Facility: CLINIC | Age: 74
End: 2024-04-03
Payer: MEDICARE

## 2024-04-03 VITALS
HEART RATE: 61 BPM | WEIGHT: 140 LBS | HEIGHT: 62 IN | SYSTOLIC BLOOD PRESSURE: 136 MMHG | BODY MASS INDEX: 25.76 KG/M2 | DIASTOLIC BLOOD PRESSURE: 80 MMHG

## 2024-04-03 PROCEDURE — 93000 ELECTROCARDIOGRAM COMPLETE: CPT

## 2024-04-03 PROCEDURE — 99214 OFFICE O/P EST MOD 30 MIN: CPT

## 2024-04-03 RX ORDER — IBUPROFEN 600 MG/1
600 TABLET, FILM COATED ORAL TWICE DAILY
Qty: 28 | Refills: 0 | Status: DISCONTINUED | COMMUNITY
Start: 2024-02-06 | End: 2024-04-03

## 2024-04-03 NOTE — ASSESSMENT
[FreeTextEntry1] : Assessment: #Episode of palpitations after EtOH - HR to 170s, felt racing heartbeat but no other symptoms - Resolved after taking metoprolol and drinking water #Paroxysmal AFIb/Flutter in setting of pericarditis 11/2022 - CHADVASC2 1, no a/c indicated given low CHASCVASC and high risk of bleeding with pericarditis/NSAID treatment - Currently in sinus #History of acute viral pericarditis, diagnosed 11/26/22 - s/p treatment with ibuprofen and colchicine x3 months - 11/26/22 ESR 90, Cr 0.6, trop negative x2, procal 0.16, pro bnp 882, .7 - 3/6/23 ESR now normal at 17, CRP increased from 13 to 23 - 5/6/23 CRP 16.4, ESR 9 - Repeat echo 3/6/23 with no pericardial effusion #Snoring   Plan: - Continue Metop tartrate 12.5 mg BID - Avoid triggers (alcohol and caffeine), increase water intake - If palpitations becoming more frequent she will notify me. We discussed that we would pursue holter vs ILR to determine AFib burden and assess for other arrythmias. We reviewed other pharmacologic options (pill in the pocket) for paroxysmal AFib - Return to clinic in 3-4 months with Dr. Cutler.

## 2024-04-03 NOTE — PHYSICAL EXAM
[Well Developed] : well developed [Well Nourished] : well nourished [No Acute Distress] : no acute distress [No Carotid Bruit] : no carotid bruit [No Rub] : no rub [No Murmur] : no murmur [Normal S1, S2] : normal S1, S2 [No Gallop] : no gallop [Clear Lung Fields] : clear lung fields [Good Air Entry] : good air entry [No Respiratory Distress] : no respiratory distress  [Moves all extremities] : moves all extremities [Normal Speech] : normal speech [No Focal Deficits] : no focal deficits [No edema] : no edema [No varicosities] : no varicosities [No chronic venous stasis changes] : no chronic venous stasis changes [No rashes] : no rashes [de-identified] : no rub

## 2024-04-03 NOTE — CARDIOLOGY SUMMARY
[de-identified] : EKG 12/6/22 sinus bradycardia 55 bpm  EKG 3/9/23 Normal sinus rhythm LAE, inferior infarct  EKG 6/20/23 Normal sinus rhythm 63 bpm  EKG 10/10/23 Normal sinus rhythm 60 bpm  EKG 11/28/23 Normal sinus rhythm 71 bpm  EKG 04/03/24-NSR 61 bpm. Inferior infarct.  [de-identified] : TTE 11/26/22 normal global LV sys function EF 59%, G1DD, small pericardial effusion, mild MR, mild TR, mild AI, mild PI TTE 12/29/22 normal LV function, G1 DD, trivial pericardial effusion, mild MR, mild to mod TR, mild AI, mild PI, ao 3.8 cm at sinuses of valsalva TTE 02/12/2024- EF 63%, mild AR, MR, TR, AA 3.50cm

## 2024-04-03 NOTE — HISTORY OF PRESENT ILLNESS
[FreeTextEntry1] : 73F with admission for chest pain 2022, diagnosed with pericarditis and paroxysmal AFib not on a/c here for follow-up.    2024- Patient presents for F/U visit. CRP is mildly elevated (9.3). Cholesterol level is not at goal (,  as of 24). Patient feeling better. Denies further symptoms of CP, SOB, palpitations.   24-Patient had cough with some CP recently. Her son is PA and checked vitals and all was normal. She went to urgent care and was told to go get further work up. She started taking Ibuprofen 600mg PO BID which has been helping.   23 Thanksgiving night had 2 cocktails with dinner, went to bed and woke up with palpitations. Felt heart racing. No lightheadedness/dizziness, pre-syncope/syncope, chest pain. Apple watch showed HR of 170. She took metoprolol tartrate 25 mg daily and ASA. Drank water and her HR improved. She then increased metop tartrate from her usual 12.5 mg BID to 25 mg BID for 2 days. On third day HR down to 48 bpm and she went back to metop tar 12.5 mg BID. This is the first episode of palpitations since 2022 when admitted for pAFib and pericarditis.   10/10/23 Feels well. No chest pain, shortness of breath, palpitations, lightheadedness/dizziness, pre-syncope/syncope, or lower extremity edema.  23 Apple watch notified her of Increased HR to 102 bpm. Asymptomatic. Increased stress. Daughter getting . Dog . Cataract surgery.   Resumed metoprolol 12.5 mg BID with improvement in HRs on watch. She is doing BID EKGs on her watch and all normal rhythm. No AFib/Flutter.   23 Cataract surgery a few weeks ago  Occasional intermittent pleuritic pain that resolves with belching - currently resolved   No shortness of breath, palpitations, lightheadedness/dizziness, pre-syncope/syncope, or lower extremity edema.   23 Pt is seen for f.u. Pt denies any SOB, no chest pain, no palpitations.   TTE 23 EF 67% RA dilated, Mild to Mod TR , prox ascending Aorta is mildly dilated.  23 C Reactive protein 22.9   S/p F/U with Dr Staley, event monitor showed no A fib,   1/3/23 COVID infection week before [place]. No hospitalization  Feels better   No chest pain, shortness of breath, palpitations, lightheadedness/dizziness, pre-syncope/syncope, or lower extremity edema.   Compliant with medications  22 Here with daughter Muna  Feels good in the morning, fatigued by afternoon. No chest pain, palpitations. BOWMAN with stairs. No fevers, chills. Lingering cough.  Compliant with medications. Overwhelmed by hospital admission, medications and need to follow-up with physicians.   22 ESR 90, Cr 0.6, trop negative x2, procal 0.16, pro bnp 882, .7

## 2024-04-03 NOTE — REVIEW OF SYSTEMS
[Chest Discomfort] : chest discomfort [Under Stress] : under stress [Anxiety] : anxiety [Feeling Fatigued] : not feeling fatigued [SOB] : no shortness of breath [Dyspnea on exertion] : not dyspnea during exertion [Lower Ext Edema] : no extremity edema [Leg Claudication] : no intermittent leg claudication [Palpitations] : no palpitations [Orthopnea] : no orthopnea [Syncope] : no syncope [PND] : no PND [Heartburn] : no heartburn [Abdominal Pain] : no abdominal pain [Dizziness] : no dizziness

## 2024-05-22 ENCOUNTER — RX RENEWAL (OUTPATIENT)
Age: 74
End: 2024-05-22

## 2024-05-22 RX ORDER — METOPROLOL TARTRATE 25 MG/1
25 TABLET, FILM COATED ORAL
Qty: 90 | Refills: 3 | Status: ACTIVE | COMMUNITY
Start: 1900-01-01 | End: 1900-01-01

## 2024-06-20 ENCOUNTER — NON-APPOINTMENT (OUTPATIENT)
Age: 74
End: 2024-06-20

## 2024-08-05 NOTE — PHYSICAL EXAM
[Well Developed] : well developed [Well Nourished] : well nourished [No Acute Distress] : no acute distress [No Carotid Bruit] : no carotid bruit [Normal S1, S2] : normal S1, S2 [No Murmur] : no murmur [No Rub] : no rub [No Gallop] : no gallop [Clear Lung Fields] : clear lung fields [Good Air Entry] : good air entry [No Respiratory Distress] : no respiratory distress  [Moves all extremities] : moves all extremities [No Focal Deficits] : no focal deficits [Normal Speech] : normal speech [No edema] : no edema [No varicosities] : no varicosities [No chronic venous stasis changes] : no chronic venous stasis changes [No rashes] : no rashes [de-identified] : no rub

## 2024-08-05 NOTE — PHYSICAL EXAM
[Well Developed] : well developed [Well Nourished] : well nourished [No Acute Distress] : no acute distress [No Carotid Bruit] : no carotid bruit [Normal S1, S2] : normal S1, S2 [No Murmur] : no murmur [No Rub] : no rub [No Gallop] : no gallop [Clear Lung Fields] : clear lung fields [Good Air Entry] : good air entry [No Respiratory Distress] : no respiratory distress  [Moves all extremities] : moves all extremities [No Focal Deficits] : no focal deficits [Normal Speech] : normal speech [No edema] : no edema [No varicosities] : no varicosities [No chronic venous stasis changes] : no chronic venous stasis changes [No rashes] : no rashes [de-identified] : no rub

## 2024-08-06 ENCOUNTER — APPOINTMENT (OUTPATIENT)
Dept: CARDIOLOGY | Facility: CLINIC | Age: 74
End: 2024-08-06

## 2024-08-06 PROBLEM — Z86.79 HISTORY OF PERICARDITIS: Status: RESOLVED | Noted: 2022-12-06 | Resolved: 2024-08-06

## 2024-08-06 PROCEDURE — 99214 OFFICE O/P EST MOD 30 MIN: CPT

## 2024-08-06 PROCEDURE — 93000 ELECTROCARDIOGRAM COMPLETE: CPT

## 2024-08-06 NOTE — ASSESSMENT
[FreeTextEntry1] : Assessment: #Palpitations after EtOH #Paroxysmal AFIb/Flutter in setting of pericarditis 11/2022 - CHADVASC2 1, no a/c indicated given low CHASCVASC and high risk of bleeding with pericarditis/NSAID treatment - Currently in sinus #History of acute viral pericarditis, diagnosed 11/26/22 - s/p treatment with ibuprofen and colchicine x3 months - 11/26/22 ESR 90, Cr 0.6, trop negative x2, procal 0.16, pro bnp 882, .7 - Repeat echo 3/6/23 with no pericardial effusion #Snoring #DLD - 2/2024 , TG 76, HDL 69,   Plan: - Continue Metop tartrate 12.5 mg BID - Dietary changes for dyslipidemia  - Avoid triggers (alcohol and caffeine), increase water intake - If palpitations becoming more frequent she will notify me. We discussed that we would pursue holter vs ILR to determine AFib burden and assess for other arrythmias. We reviewed other pharmacologic options (pill in the pocket) for paroxysmal AFib - Encouraged plant-based and Mediterranean diets, along with increased fruit, nut, vegetable, legume, and lean vegetable or animal protein (preferably fish) consumption - Return to clinic in 6 months or sooner PRN

## 2024-08-06 NOTE — CARDIOLOGY SUMMARY
[de-identified] : EKG 12/6/22 sinus bradycardia 55 bpm  EKG 3/9/23 Normal sinus rhythm LAE, inferior infarct  EKG 6/20/23 Normal sinus rhythm 63 bpm  EKG 10/10/23 Normal sinus rhythm 60 bpm  EKG 11/28/23 Normal sinus rhythm 71 bpm  EKG 8/6/2024 Normal sinus rhythm 69 bpm  [de-identified] : TTE 11/26/22 normal global LV sys function EF 59%, G1DD, small pericardial effusion, mild MR, mild TR, mild AI, mild PI\par  TTE 12/29/22 normal LV function, G1 DD, trivial pericardial effusion, mild MR, mild to mod TR, mild AI, mild PI, ao 3.8 cm at sinuses of valsalva

## 2024-08-06 NOTE — REVIEW OF SYSTEMS
[Anxiety] : anxiety [Under Stress] : under stress [Feeling Fatigued] : not feeling fatigued [SOB] : no shortness of breath [Dyspnea on exertion] : not dyspnea during exertion [Chest Discomfort] : no chest discomfort [Lower Ext Edema] : no extremity edema [Leg Claudication] : no intermittent leg claudication [Palpitations] : no palpitations [Orthopnea] : no orthopnea [PND] : no PND [Syncope] : no syncope [Cough] : cough [Abdominal Pain] : no abdominal pain [Heartburn] : no heartburn [Dizziness] : no dizziness

## 2024-08-06 NOTE — HISTORY OF PRESENT ILLNESS
[FreeTextEntry1] : 73F with admission for chest pain 2022, diagnosed with pericarditis and paroxysmal AFib not on a/c here for follow-up.    24 Chronic cough, congestion. PCP started Singulair.  Takes metop 12.5 mg BID. HR to 90s with wine. Asymptomatic.  BOWMAN on stairs. Stable.   23 Thanksgiving night had 2 cocktails with dinner, went to bed and woke up with palpitations. Felt heart racing. No lightheadedness/dizziness, pre-syncope/syncope, chest pain. Apple watch showed HR of 170. She took metoprolol tartrate 25 mg daily and ASA. Drank water and her HR improved. She then increased metop tartrate from her usual 12.5 mg BID to 25 mg BID for 2 days. On third day HR down to 48 bpm and she went back to metop tar 12.5 mg BID. This is the first episode of palpitations since 2022 when admitted for pAFib and pericarditis.   10/10/23 Feels well. No chest pain, shortness of breath, palpitations, lightheadedness/dizziness, pre-syncope/syncope, or lower extremity edema.  23 Apple watch notified her of Increased HR to 102 bpm. Asymptomatic. Increased stress. Daughter getting . Dog . Cataract surgery.   Resumed metoprolol 12.5 mg BID with improvement in HRs on watch. She is doing BID EKGs on her watch and all normal rhythm. No AFib/Flutter.   23 Cataract surgery a few weeks ago  Occasional intermittent pleuritic pain that resolves with belching - currently resolved   No shortness of breath, palpitations, lightheadedness/dizziness, pre-syncope/syncope, or lower extremity edema.   23 Pt is seen for f.u. Pt denies any SOB, no chest pain, no palpitations.   TTE 23 EF 67% RA dilated, Mild to Mod TR , prox ascending Aorta is mildly dilated.  23 C Reactive protein 22.9   S/p F/U with Dr Staley, event monitor showed no A fib,   1/3/23 COVID infection week before [place]. No hospitalization  Feels better   No chest pain, shortness of breath, palpitations, lightheadedness/dizziness, pre-syncope/syncope, or lower extremity edema.   Compliant with medications  22 Here with daughter Muna  Feels good in the morning, fatigued by afternoon. No chest pain, palpitations. BOWMAN with stairs. No fevers, chills. Lingering cough.  Compliant with medications. Overwhelmed by hospital admission, medications and need to follow-up with physicians.   22 ESR 90, Cr 0.6, trop negative x2, procal 0.16, pro bnp 882, .7

## 2024-08-06 NOTE — CARDIOLOGY SUMMARY
[de-identified] : EKG 12/6/22 sinus bradycardia 55 bpm  EKG 3/9/23 Normal sinus rhythm LAE, inferior infarct  EKG 6/20/23 Normal sinus rhythm 63 bpm  EKG 10/10/23 Normal sinus rhythm 60 bpm  EKG 11/28/23 Normal sinus rhythm 71 bpm  EKG 8/6/2024 Normal sinus rhythm 69 bpm  [de-identified] : TTE 11/26/22 normal global LV sys function EF 59%, G1DD, small pericardial effusion, mild MR, mild TR, mild AI, mild PI\par  TTE 12/29/22 normal LV function, G1 DD, trivial pericardial effusion, mild MR, mild to mod TR, mild AI, mild PI, ao 3.8 cm at sinuses of valsalva

## 2024-10-20 ENCOUNTER — EMERGENCY (EMERGENCY)
Facility: HOSPITAL | Age: 74
LOS: 0 days | Discharge: HOME | End: 2024-10-20
Attending: EMERGENCY MEDICINE
Payer: MEDICARE

## 2024-10-20 VITALS
TEMPERATURE: 101 F | OXYGEN SATURATION: 96 % | DIASTOLIC BLOOD PRESSURE: 66 MMHG | HEART RATE: 103 BPM | SYSTOLIC BLOOD PRESSURE: 112 MMHG | WEIGHT: 139.99 LBS | RESPIRATION RATE: 20 BRPM

## 2024-10-20 VITALS — TEMPERATURE: 103 F | HEART RATE: 125 BPM

## 2024-10-20 DIAGNOSIS — Z98.49 CATARACT EXTRACTION STATUS, UNSPECIFIED EYE: Chronic | ICD-10-CM

## 2024-10-20 DIAGNOSIS — J18.9 PNEUMONIA, UNSPECIFIED ORGANISM: ICD-10-CM

## 2024-10-20 DIAGNOSIS — Z88.0 ALLERGY STATUS TO PENICILLIN: ICD-10-CM

## 2024-10-20 DIAGNOSIS — J45.909 UNSPECIFIED ASTHMA, UNCOMPLICATED: ICD-10-CM

## 2024-10-20 DIAGNOSIS — R05.9 COUGH, UNSPECIFIED: ICD-10-CM

## 2024-10-20 DIAGNOSIS — R52 PAIN, UNSPECIFIED: ICD-10-CM

## 2024-10-20 DIAGNOSIS — N39.0 URINARY TRACT INFECTION, SITE NOT SPECIFIED: ICD-10-CM

## 2024-10-20 DIAGNOSIS — I48.91 UNSPECIFIED ATRIAL FIBRILLATION: ICD-10-CM

## 2024-10-20 DIAGNOSIS — R50.9 FEVER, UNSPECIFIED: ICD-10-CM

## 2024-10-20 LAB
ALBUMIN SERPL ELPH-MCNC: 3.8 G/DL — SIGNIFICANT CHANGE UP (ref 3.5–5.2)
ALP SERPL-CCNC: 139 U/L — HIGH (ref 30–115)
ALT FLD-CCNC: 142 U/L — HIGH (ref 0–41)
ANION GAP SERPL CALC-SCNC: 13 MMOL/L — SIGNIFICANT CHANGE UP (ref 7–14)
APPEARANCE UR: ABNORMAL
APTT BLD: 31.2 SEC — SIGNIFICANT CHANGE UP (ref 27–39.2)
AST SERPL-CCNC: 152 U/L — HIGH (ref 0–41)
BASOPHILS # BLD AUTO: 0 K/UL — SIGNIFICANT CHANGE UP (ref 0–0.2)
BASOPHILS NFR BLD AUTO: 0 % — SIGNIFICANT CHANGE UP (ref 0–1)
BILIRUB SERPL-MCNC: 1.2 MG/DL — SIGNIFICANT CHANGE UP (ref 0.2–1.2)
BILIRUB UR-MCNC: ABNORMAL
BUN SERPL-MCNC: 12 MG/DL — SIGNIFICANT CHANGE UP (ref 10–20)
CALCIUM SERPL-MCNC: 8.9 MG/DL — SIGNIFICANT CHANGE UP (ref 8.4–10.5)
CHLORIDE SERPL-SCNC: 98 MMOL/L — SIGNIFICANT CHANGE UP (ref 98–110)
CO2 SERPL-SCNC: 24 MMOL/L — SIGNIFICANT CHANGE UP (ref 17–32)
COLOR SPEC: ABNORMAL
CREAT SERPL-MCNC: 0.7 MG/DL — SIGNIFICANT CHANGE UP (ref 0.7–1.5)
DIFF PNL FLD: NEGATIVE — SIGNIFICANT CHANGE UP
EGFR: 91 ML/MIN/1.73M2 — SIGNIFICANT CHANGE UP
EOSINOPHIL NFR BLD AUTO: 0 % — SIGNIFICANT CHANGE UP (ref 0–8)
FLUAV AG NPH QL: SIGNIFICANT CHANGE UP
FLUBV AG NPH QL: SIGNIFICANT CHANGE UP
GLUCOSE SERPL-MCNC: 90 MG/DL — SIGNIFICANT CHANGE UP (ref 70–99)
GLUCOSE UR QL: NEGATIVE MG/DL — SIGNIFICANT CHANGE UP
HCT VFR BLD CALC: 38.6 % — SIGNIFICANT CHANGE UP (ref 37–47)
HGB BLD-MCNC: 13 G/DL — SIGNIFICANT CHANGE UP (ref 12–16)
INR BLD: 1.31 RATIO — HIGH (ref 0.65–1.3)
KETONES UR-MCNC: 15 MG/DL
LACTATE SERPL-SCNC: 1.1 MMOL/L — SIGNIFICANT CHANGE UP (ref 0.7–2)
LEUKOCYTE ESTERASE UR-ACNC: ABNORMAL
LYMPHOCYTES # BLD AUTO: 0.71 K/UL — LOW (ref 1.2–3.4)
LYMPHOCYTES # BLD AUTO: 4 % — LOW (ref 20.5–51.1)
MCHC RBC-ENTMCNC: 29.2 PG — SIGNIFICANT CHANGE UP (ref 27–31)
MCHC RBC-ENTMCNC: 33.7 G/DL — SIGNIFICANT CHANGE UP (ref 32–37)
MCV RBC AUTO: 86.7 FL — SIGNIFICANT CHANGE UP (ref 81–99)
MONOCYTES # BLD AUTO: 0.36 K/UL — SIGNIFICANT CHANGE UP (ref 0.1–0.6)
MONOCYTES NFR BLD AUTO: 2 % — SIGNIFICANT CHANGE UP (ref 1.7–9.3)
NEUTROPHILS # BLD AUTO: 16.75 K/UL — HIGH (ref 1.4–6.5)
NEUTROPHILS NFR BLD AUTO: 85 % — HIGH (ref 42.2–75.2)
NITRITE UR-MCNC: POSITIVE
NRBC # BLD: SIGNIFICANT CHANGE UP /100 WBCS (ref 0–0)
PH UR: 5.5 — SIGNIFICANT CHANGE UP (ref 5–8)
PLATELET # BLD AUTO: 164 K/UL — SIGNIFICANT CHANGE UP (ref 130–400)
PMV BLD: 10.6 FL — HIGH (ref 7.4–10.4)
POTASSIUM SERPL-MCNC: 3.7 MMOL/L — SIGNIFICANT CHANGE UP (ref 3.5–5)
POTASSIUM SERPL-SCNC: 3.7 MMOL/L — SIGNIFICANT CHANGE UP (ref 3.5–5)
PROT SERPL-MCNC: 6.5 G/DL — SIGNIFICANT CHANGE UP (ref 6–8)
PROT UR-MCNC: 30 MG/DL
PROTHROM AB SERPL-ACNC: 15 SEC — HIGH (ref 9.95–12.87)
RBC # BLD: 4.45 M/UL — SIGNIFICANT CHANGE UP (ref 4.2–5.4)
RBC # FLD: 13.3 % — SIGNIFICANT CHANGE UP (ref 11.5–14.5)
RSV RNA NPH QL NAA+NON-PROBE: SIGNIFICANT CHANGE UP
SARS-COV-2 RNA SPEC QL NAA+PROBE: SIGNIFICANT CHANGE UP
SODIUM SERPL-SCNC: 135 MMOL/L — SIGNIFICANT CHANGE UP (ref 135–146)
SP GR SPEC: 1.03 — SIGNIFICANT CHANGE UP (ref 1–1.03)
TROPONIN T, HIGH SENSITIVITY RESULT: 10 NG/L — SIGNIFICANT CHANGE UP (ref 6–13)
TROPONIN T, HIGH SENSITIVITY RESULT: 12 NG/L — SIGNIFICANT CHANGE UP (ref 6–13)
UROBILINOGEN FLD QL: 1 MG/DL — SIGNIFICANT CHANGE UP (ref 0.2–1)
WBC # BLD: 17.82 K/UL — HIGH (ref 4.8–10.8)
WBC # FLD AUTO: 17.82 K/UL — HIGH (ref 4.8–10.8)

## 2024-10-20 PROCEDURE — 96374 THER/PROPH/DIAG INJ IV PUSH: CPT

## 2024-10-20 PROCEDURE — 80053 COMPREHEN METABOLIC PANEL: CPT

## 2024-10-20 PROCEDURE — 71045 X-RAY EXAM CHEST 1 VIEW: CPT | Mod: 26

## 2024-10-20 PROCEDURE — 85025 COMPLETE CBC W/AUTO DIFF WBC: CPT

## 2024-10-20 PROCEDURE — 71045 X-RAY EXAM CHEST 1 VIEW: CPT

## 2024-10-20 PROCEDURE — 76705 ECHO EXAM OF ABDOMEN: CPT

## 2024-10-20 PROCEDURE — 99285 EMERGENCY DEPT VISIT HI MDM: CPT | Mod: FS

## 2024-10-20 PROCEDURE — 96376 TX/PRO/DX INJ SAME DRUG ADON: CPT

## 2024-10-20 PROCEDURE — 36415 COLL VENOUS BLD VENIPUNCTURE: CPT

## 2024-10-20 PROCEDURE — 87040 BLOOD CULTURE FOR BACTERIA: CPT

## 2024-10-20 PROCEDURE — 0241U: CPT

## 2024-10-20 PROCEDURE — 84484 ASSAY OF TROPONIN QUANT: CPT

## 2024-10-20 PROCEDURE — 93005 ELECTROCARDIOGRAM TRACING: CPT

## 2024-10-20 PROCEDURE — 81001 URINALYSIS AUTO W/SCOPE: CPT

## 2024-10-20 PROCEDURE — 93010 ELECTROCARDIOGRAM REPORT: CPT | Mod: 76

## 2024-10-20 PROCEDURE — 83605 ASSAY OF LACTIC ACID: CPT

## 2024-10-20 PROCEDURE — 99285 EMERGENCY DEPT VISIT HI MDM: CPT | Mod: 25

## 2024-10-20 PROCEDURE — 85610 PROTHROMBIN TIME: CPT

## 2024-10-20 PROCEDURE — 85730 THROMBOPLASTIN TIME PARTIAL: CPT

## 2024-10-20 PROCEDURE — 76705 ECHO EXAM OF ABDOMEN: CPT | Mod: 26

## 2024-10-20 RX ORDER — KETOROLAC TROMETHAMINE 10 MG/1
15 TABLET, FILM COATED ORAL ONCE
Refills: 0 | Status: DISCONTINUED | OUTPATIENT
Start: 2024-10-20 | End: 2024-10-20

## 2024-10-20 RX ORDER — ACETAMINOPHEN 325 MG
975 TABLET ORAL ONCE
Refills: 0 | Status: COMPLETED | OUTPATIENT
Start: 2024-10-20 | End: 2024-10-20

## 2024-10-20 RX ORDER — SODIUM CHLORIDE IRRIG SOLUTION 0.9 %
1000 SOLUTION, IRRIGATION IRRIGATION ONCE
Refills: 0 | Status: COMPLETED | OUTPATIENT
Start: 2024-10-20 | End: 2024-10-20

## 2024-10-20 RX ADMIN — Medication 975 MILLIGRAM(S): at 17:51

## 2024-10-20 RX ADMIN — Medication 1000 MILLILITER(S): at 12:19

## 2024-10-20 RX ADMIN — KETOROLAC TROMETHAMINE 15 MILLIGRAM(S): 10 TABLET, FILM COATED ORAL at 17:51

## 2024-10-20 RX ADMIN — KETOROLAC TROMETHAMINE 15 MILLIGRAM(S): 10 TABLET, FILM COATED ORAL at 17:26

## 2024-10-20 RX ADMIN — Medication 975 MILLIGRAM(S): at 17:27

## 2024-10-20 RX ADMIN — KETOROLAC TROMETHAMINE 15 MILLIGRAM(S): 10 TABLET, FILM COATED ORAL at 12:22

## 2024-10-20 NOTE — ED PROVIDER NOTE - PATIENT PORTAL LINK FT
You can access the FollowMyHealth Patient Portal offered by [facility] by registering at the following website: [email or web address]. By joining [facility]’s FollowMyHealth portal, you will also be able to view your health information using other applications (apps) compatible with our system.

## 2024-10-20 NOTE — ED PROVIDER NOTE - CLINICAL SUMMARY MEDICAL DECISION MAKING FREE TEXT BOX
Pt presents with fever. Cough. Chest x-ray neg. labs with increased wbc. Possible UTI. Will treat with levaquin-broad spectrum antibiotic. Follow up pmd this week. Elevated LFTs. No clear reason. could be viral.

## 2024-10-20 NOTE — ED PROVIDER NOTE - OBJECTIVE STATEMENT
73-year-old female past medical history of pericarditis, A-fib presents for evaluation of fever.  Endorses intermittent fever x 4 days with associated nonproductive cough and bodyaches.  Improved with Tylenol, no inciting factors.  Denies headache, chest pain, shortness of breath, abdominal pain, dysuria, hematuria, vomiting, diarrhea

## 2024-10-20 NOTE — ED PROVIDER NOTE - NSFOLLOWUPINSTRUCTIONS_ED_ALL_ED_FT
Follow up with PMD.    Urinary Tract Infection    A urinary tract infection (UTI) is an infection of any part of the urinary tract, which includes the kidneys, ureters, bladder, and urethra. Risk factors include ignoring your need to urinate, wiping back to front if female, being an uncircumcised male, and having diabetes or a weak immune system. Symptoms include frequent urination, pain or burning with urination, foul smelling urine, cloudy urine, pain in the lower abdomen, blood in the urine, and fever. If you were prescribed an antibiotic medicine, take it as told by your health care provider. Do not stop taking the antibiotic even if you start to feel better.    SEEK IMMEDIATE MEDICAL CARE IF YOU HAVE ANY OF THE FOLLOWING SYMPTOMS: severe back or abdominal pain, fever, inability to keep fluids or medicine down, dizziness/lightheadedness, or a change in mental status. Follow up with PMD. This week to repeat labs for liver.     Urinary Tract Infection    A urinary tract infection (UTI) is an infection of any part of the urinary tract, which includes the kidneys, ureters, bladder, and urethra. Risk factors include ignoring your need to urinate, wiping back to front if female, being an uncircumcised male, and having diabetes or a weak immune system. Symptoms include frequent urination, pain or burning with urination, foul smelling urine, cloudy urine, pain in the lower abdomen, blood in the urine, and fever. If you were prescribed an antibiotic medicine, take it as told by your health care provider. Do not stop taking the antibiotic even if you start to feel better.    SEEK IMMEDIATE MEDICAL CARE IF YOU HAVE ANY OF THE FOLLOWING SYMPTOMS: severe back or abdominal pain, fever, inability to keep fluids or medicine down, dizziness/lightheadedness, or a change in mental status.

## 2024-10-20 NOTE — ED PROVIDER NOTE - PHYSICAL EXAMINATION
CONST: NAD  EYES: Sclera and conjunctiva clear.   ENT: Clear nasal discharge. Oropharynx normal appearing  NECK: Non-tender, no meningeal signs. normal ROM. supple   CARD: S1 S2; No jvd  RESP: Equal BS B/L, No wheezes, rhonchi or rales. No distress  GI: Soft, non-tender, non-distended. no cva tenderness. normal BS  MS: Normal ROM in all extremities. pulses 2 +. no calf tenderness or swelling  SKIN: Warm, dry, no acute rashes. Good turgor

## 2024-10-20 NOTE — ED PROVIDER NOTE - ATTENDING APP SHARED VISIT CONTRIBUTION OF CARE
Pt reports fever, chills, shakes, since Thursday. Feel better after Tylenol. History of Asthma under the care of Dr. Escalante. On Breo. Hx of afib in the past. Not on anticoagulation. Also notes shortness of breath and nausea. no vomiting. no diarrhea. No urinary complaints. No dental complaints. On exam S1S2 rrr, no murmur, lungs clear, abdomen is soft nontender, neuro intact.

## 2024-10-31 ENCOUNTER — APPOINTMENT (OUTPATIENT)
Dept: CARDIOLOGY | Facility: CLINIC | Age: 74
End: 2024-10-31
Payer: MEDICARE

## 2024-10-31 ENCOUNTER — RESULT CHARGE (OUTPATIENT)
Age: 74
End: 2024-10-31

## 2024-10-31 VITALS
BODY MASS INDEX: 25.76 KG/M2 | OXYGEN SATURATION: 93 % | DIASTOLIC BLOOD PRESSURE: 82 MMHG | HEIGHT: 62 IN | SYSTOLIC BLOOD PRESSURE: 127 MMHG | WEIGHT: 140 LBS | HEART RATE: 89 BPM

## 2024-10-31 DIAGNOSIS — Z71.89 OTHER SPECIFIED COUNSELING: ICD-10-CM

## 2024-10-31 DIAGNOSIS — R00.2 PALPITATIONS: ICD-10-CM

## 2024-10-31 DIAGNOSIS — I48.0 PAROXYSMAL ATRIAL FIBRILLATION: ICD-10-CM

## 2024-10-31 PROCEDURE — 93000 ELECTROCARDIOGRAM COMPLETE: CPT

## 2024-10-31 PROCEDURE — 99214 OFFICE O/P EST MOD 30 MIN: CPT

## 2024-11-14 ENCOUNTER — EMERGENCY (EMERGENCY)
Facility: HOSPITAL | Age: 74
LOS: 0 days | Discharge: HOME | End: 2024-11-14
Attending: EMERGENCY MEDICINE
Payer: MEDICARE

## 2024-11-14 VITALS
TEMPERATURE: 98 F | HEIGHT: 62 IN | SYSTOLIC BLOOD PRESSURE: 135 MMHG | RESPIRATION RATE: 20 BRPM | WEIGHT: 139.99 LBS | OXYGEN SATURATION: 99 % | DIASTOLIC BLOOD PRESSURE: 94 MMHG | HEART RATE: 83 BPM

## 2024-11-14 DIAGNOSIS — Z98.49 CATARACT EXTRACTION STATUS, UNSPECIFIED EYE: Chronic | ICD-10-CM

## 2024-11-14 DIAGNOSIS — Z88.0 ALLERGY STATUS TO PENICILLIN: ICD-10-CM

## 2024-11-14 DIAGNOSIS — I80.03 PHLEBITIS AND THROMBOPHLEBITIS OF SUPERFICIAL VESSELS OF LOWER EXTREMITIES, BILATERAL: ICD-10-CM

## 2024-11-14 DIAGNOSIS — L53.9 ERYTHEMATOUS CONDITION, UNSPECIFIED: ICD-10-CM

## 2024-11-14 PROCEDURE — 93970 EXTREMITY STUDY: CPT | Mod: 26

## 2024-11-14 PROCEDURE — 99284 EMERGENCY DEPT VISIT MOD MDM: CPT | Mod: FS

## 2024-11-14 PROCEDURE — 99284 EMERGENCY DEPT VISIT MOD MDM: CPT | Mod: 25

## 2024-11-14 PROCEDURE — 93970 EXTREMITY STUDY: CPT

## 2024-11-14 NOTE — ED PROVIDER NOTE - OBJECTIVE STATEMENT
Patient c/o left leg redness, pain past week, just return  from a long flight from [place],  no chest pain no SOB

## 2024-11-14 NOTE — ED ADULT NURSE NOTE - MODE OF DISCHARGE
Physical Therapy Daily Progress Note    VISIT#: 8    Subjective   Ayad Sue reports: that her kneecap was stiff after last session and it's been hurting a little more.       Objective     See Exercise, Manual, and Modality Logs for complete treatment.       Assessment & Plan     Assessment  Assessment details: Pt demonstrates decreased pain after manual therapy. Sets decreased back to two this date. Pt tolerated exercises well.           Progress per Plan of Care            Timed:         Manual Therapy:    10     mins  68311;     Therapeutic Exercise:    20     mins  57514;     Neuromuscular Sharon:        mins  51094;    Therapeutic Activity:          mins  69765;     Gait Training:           mins  36604;     Ultrasound:          mins  99696;    Ionto                                   mins   36712  Self Care                            mins   32139    Un-Timed:  Electrical Stimulation:         mins  33824 ( );  Traction          mins 20461    Timed Treatment:   30   mins   Total Treatment:     30   mins    Carolyn Law, PT, DPT, OCS  [place] License # [id number]  Physical Therapist  
Ambulatory

## 2024-11-14 NOTE — ED ADULT TRIAGE NOTE - GLASGOW COMA SCALE: BEST MOTOR RESPONSE, MLM
It is not appropriate for me to be adjusting his pain medication without assessing him or while he is actively inpatient in the hospital.  His internal medicine provider and I have been in contact re: how to proceed.     Susana GRANT RN CNP, FNP  [facility]  [facility]       (M6) obeys commands

## 2024-11-14 NOTE — ED PROVIDER NOTE - PATIENT PORTAL LINK FT
You can access the FollowMyHealth Patient Portal offered by [facility] by registering at the following website: [email or web address]. By joining [facility]’s FollowMyHealth portal, you will also be able to view your health information using other applications (apps) compatible with our system.

## 2024-11-14 NOTE — ED PROVIDER NOTE - NSFOLLOWUPINSTRUCTIONS_ED_ALL_ED_FT
take aspirin 325 mg once a day , elevate legs, warm compresses, supportive stockings, Follow up with PMD next week

## 2024-11-14 NOTE — ED ADULT NURSE NOTE - NS_SISCREENINGSR_GEN_ALL_ED
NEUROSURGERY CONSULT  WALDEMAR HICKMAN   12-16-22 @ 02:15    HPI: 77M PMH HTN and HLD on ASA-81, s/p T11-T12 schwannoma resection with Dr. Ann August 10, 2022 now presenting for a syncopal episode & fall +HT ?LOC. Patient seen and examined at bedside with daughter, Heidi, who states that her father was doing errands and chores around the house, when all of a sudden he fell. Pt is followed by cardio, satish, for near syncope episodes for the last several months-year and had a monitor which demonstrated multiple periods of bradycardia 38-40's per daughter. no intervention was planned, just close monitoring and pts last visit with him was 3 mo ago. Patient denies HA, N/V, blurry vision, changes in vision, weakness, urinary or bowel incontinence, chest pain, dizziness. Neurosurgery was consulted for CTH showing questionable 1mm L frontal hyperdensity, possibly reflecting artifact vs SDH.     RADIOLOGY:   < from: CT Head No Cont (12.15.22 @ 23:28) >  Impression:  Questionable increased density along the left inner table in the frontal   lobe (6/46). It measures up to 1 mm in thickness. This may reflect an   artifact or a tiny subdural hemorrhage. No evidence of mass effect or   midline shift.  Follow-up in 6 hours may be of benefit.    PAST MEDICAL & SURGICAL HISTORY:  HTN (hypertension)  HLD (hyperlipidemia)    FAMILY HISTORY:    SOCIAL HISTORY:  Tobacco Use:  EtOH use:   Substance:    Allergies  No Known Allergies  Intolerances    [X] A ten-point review of systems is negative except as noted   [  ] Due to altered mental status/intubation, subjective information were not able to be obtained from the patient. History was obtained, to the extent possible, from review of the chart and collateral sources of information    MEDS:      PHYSICAL EXAM:  GENERAL: NAD, speaks in full sentences, no signs of respiratory distress  HEAD:  Atraumatic, Normocephalic  NECK: Supple, No JVD  CHEST/LUNG: Clear to auscultation bilaterally; No wheeze; No crackles; No accessory muscles used  HEART: Regular rate and rhythm;   ABDOMEN: Soft, Nontender, Nondistended; Bowel sounds present; No guarding  EXTREMITIES:  2+ Peripheral Pulses, No cyanosis or edema  MSK: No tenderness to palpation    NEUROLOGICAL EXAM   AOx3, appropriate, follows commands  PERRL, EOMI  GARNETT x 4 with good strength   Sensation intact to light touch   No protonator drift     Vital Signs Last 24 Hrs  T(C): 36.6 (16 Dec 2022 00:38), Max: 36.6 (16 Dec 2022 00:38)  T(F): 97.8 (16 Dec 2022 00:38), Max: 97.8 (16 Dec 2022 00:38)  HR: 80 (16 Dec 2022 00:38) (79 - 80)  BP: 151/74 (16 Dec 2022 00:38) (151/74 - 174/70)  BP(mean): --  RR: 18 (16 Dec 2022 00:38) (18 - 18)  SpO2: 97% (16 Dec 2022 00:38) (97% - 100%)    Parameters below as of 16 Dec 2022 00:38  Patient On (Oxygen Delivery Method): room air          LABS:                        15.5   9.94  )-----------( 244      ( 15 Dec 2022 23:21 )             44.4     12-15    141  |  103  |  19  ----------------------------<  93  5.2<H>   |  25  |  0.8    Ca    9.5      15 Dec 2022 23:21  Mg     2.2     12-15    TPro  7.4  /  Alb  5.1  /  TBili  0.8  /  DBili  x   /  AST  34  /  ALT  20  /  AlkPhos  84  12-15              
HPI:  79 Y/O independent male with controlled HTN and HLD who presents to ED with daughter after syncopal episode. Patient reports he ran multiple errands today and was feeling well, came home and cooked, ate lunch, and after bringing his bowl to sink, syncopized. LOC +ve no urinary/fecal incontinence no tounge biting seizure like activity or post-ictal period. No reported chest pain or palpitations. Pt fell back onto tile floor sustaining small lac to posterior R scalp.  pt is followed by cardiosatish, for near syncope episodes for the last several months-year and had a monitor which demonstrated multiple periods of bradycardia 38-40's per daughter. no intervention was planned, just close monitoring. Denies fever/chill/HA/dizziness/chest pain/palpitation/sob/abd pain/n/v/d/ black stool/bloody stool/urinary sxs     Pt seen and examined at bedside. pt reports he was in the kitchen when he felt he was about to fall. Pt collapsed and woke up on the floor. Pt injured his head and was brought in by his daughter. Pt reports episodes of dizziness in the past. had [facility] outpatient with Dr. covarrubias shows Mobitz type I    PAST MEDICAL & SURGICAL HISTORY  HTN (hypertension)    HLD (hyperlipidemia)        FAMILY HISTORY:  FAMILY HISTORY:      SOCIAL HISTORY:  []smoker  []Alcohol  []Drug    ALLERGIES:  No Known Allergies      MEDICATIONS:  MEDICATIONS  (STANDING):  amLODIPine   Tablet 5 milliGRAM(s) Oral daily  atorvastatin 20 milliGRAM(s) Oral at bedtime  lisinopril 10 milliGRAM(s) Oral daily    MEDICATIONS  (PRN):  atropine Injectable 1 milliGRAM(s) IntraMuscular once PRN symptomatic bradycardia      HOME MEDICATIONS:  Home Medications:  amlodipine-benazepril 5 mg-10 mg oral capsule: 1 cap(s) orally once a day (16 Dec 2022 04:29)  atorvastatin 20 mg oral tablet: 1 tab(s) orally once a day (16 Dec 2022 04:14)      VITALS:   T(F): 98 (12-16 @ 05:46), Max: 98 (12-16 @ 05:45)  HR: 65 (12-16 @ 07:15) (56 - 80)  BP: 130/56 (12-16 @ 07:15) (113/56 - 174/70)  BP(mean): --  RR: 18 (12-16 @ 07:15) (16 - 18)  SpO2: 98% (12-16 @ 07:15) (97% - 100%)    I&O's Summary      REVIEW OF SYSTEMS:  CONSTITUTIONAL: No weakness, fevers or chills  EYES: No visual changes  ENT: No vertigo or throat pain   NECK: No pain or stiffness  RESPIRATORY: No cough, wheezing, hemoptysis; No shortness of breath  CARDIOVASCULAR: No chest pain or palpitations  GASTROINTESTINAL: No abdominal or epigastric pain. No nausea, vomiting, or hematemesis; No diarrhea or constipation. No melena or hematochezia.  GENITOURINARY: No dysuria, frequency or hematuria  NEUROLOGICAL: Syncope  SKIN: No itching, no rashes  MSK: no    PHYSICAL EXAM:  NEURO: patient is awake , alert and oriented  GEN: Not in acute distress  NECK: no thyroid enlargement, no JVD  LUNGS: Clear to auscultation bilaterally   CARDIOVASCULAR: S1/S2 present, RRR , no murmurs or rubs, no carotid bruits,  + PP bilaterally  ABD: Soft, non-tender, non-distended, +BS  EXT: No HORACE  SKIN: Intact    LABS:                        15.5   9.94  )-----------( 244      ( 15 Dec 2022 23:21 )             44.4     12-15    141  |  103  |  19  ----------------------------<  93  5.2<H>   |  25  |  0.8    Ca    9.5      15 Dec 2022 23:21  Mg     2.2     12-15    TPro  7.4  /  Alb  5.1  /  TBili  0.8  /  DBili  x   /  AST  34  /  ALT  20  /  AlkPhos  84  12-15      Troponin T, Serum: <0.01 ng/mL (12-15-22 @ 23:21)    CARDIAC MARKERS ( 15 Dec 2022 23:21 )  x     / <0.01 ng/mL / x     / x     / x            Troponin trend:    Serum Pro-Brain Natriuretic Peptide: 153 pg/mL (12-15-22 @ 23:21)          RADIOLOGY:  -CXR:  -TTE:  -CCTA:  -STRESS TEST:  -CATHETERIZATION:  Left main: Normal    LAD: Mild diffuse disease  Diag1: Moderate disease in proximal segment    Left Circumflex: Mild disease  OM: Mild disease    Right Coronary Artery:       Ostial: 70-80% stenosis       Prox: 60-70% stenosis       Dist: 90% stenosis  ECG:  NSR with mobitz type II    TELEMETRY EVENTS:  
Negative

## 2024-11-14 NOTE — ED PROVIDER NOTE - CLINICAL SUMMARY MEDICAL DECISION MAKING FREE TEXT BOX
Patient with left leg swelling small bruising present. duplex with superficial thrombophlebitis advised aspirin warm compresses stockings leg elevation outpatient follow-up

## 2024-11-19 ENCOUNTER — APPOINTMENT (OUTPATIENT)
Dept: CARDIOLOGY | Facility: CLINIC | Age: 74
End: 2024-11-19
Payer: MEDICARE

## 2024-11-19 VITALS
DIASTOLIC BLOOD PRESSURE: 80 MMHG | HEIGHT: 62 IN | SYSTOLIC BLOOD PRESSURE: 132 MMHG | BODY MASS INDEX: 24.84 KG/M2 | WEIGHT: 135 LBS

## 2024-11-19 DIAGNOSIS — I80.02 PHLEBITIS AND THROMBOPHLEBITIS OF SUPERFICIAL VESSELS OF LEFT LOWER EXTREMITY: ICD-10-CM

## 2024-11-19 PROCEDURE — 99214 OFFICE O/P EST MOD 30 MIN: CPT

## 2024-11-19 RX ORDER — ASPIRIN 325 MG/1
325 TABLET, FILM COATED ORAL
Refills: 0 | Status: ACTIVE | COMMUNITY

## 2024-11-19 RX ORDER — FLUTICASONE FUROATE AND VILANTEROL TRIFENATATE 100; 25 UG/1; UG/1
100-25 POWDER RESPIRATORY (INHALATION)
Refills: 0 | Status: ACTIVE | COMMUNITY

## 2024-11-19 RX ORDER — ALBUTEROL SULFATE 2.5 MG/3ML
(2.5 MG/3ML) SOLUTION RESPIRATORY (INHALATION)
Refills: 0 | Status: ACTIVE | COMMUNITY

## 2024-11-26 ENCOUNTER — APPOINTMENT (OUTPATIENT)
Dept: CARDIOLOGY | Facility: CLINIC | Age: 74
End: 2024-11-26
Payer: MEDICARE

## 2024-11-26 PROCEDURE — 93971 EXTREMITY STUDY: CPT

## 2024-12-10 ENCOUNTER — APPOINTMENT (OUTPATIENT)
Dept: CARDIOLOGY | Facility: CLINIC | Age: 74
End: 2024-12-10
Payer: MEDICARE

## 2024-12-10 PROCEDURE — 93971 EXTREMITY STUDY: CPT

## 2024-12-30 ENCOUNTER — NON-APPOINTMENT (OUTPATIENT)
Age: 74
End: 2024-12-30

## 2025-01-17 ENCOUNTER — APPOINTMENT (OUTPATIENT)
Dept: CARDIOLOGY | Facility: CLINIC | Age: 75
End: 2025-01-17

## 2025-01-17 PROCEDURE — 93971 EXTREMITY STUDY: CPT

## 2025-02-04 ENCOUNTER — APPOINTMENT (OUTPATIENT)
Dept: CARDIOLOGY | Facility: CLINIC | Age: 75
End: 2025-02-04
Payer: MEDICARE

## 2025-02-04 VITALS — BODY MASS INDEX: 25.58 KG/M2 | HEIGHT: 62 IN | WEIGHT: 139 LBS | TEMPERATURE: 97.8 F

## 2025-02-04 VITALS — HEART RATE: 68 BPM | SYSTOLIC BLOOD PRESSURE: 130 MMHG | DIASTOLIC BLOOD PRESSURE: 80 MMHG

## 2025-02-04 DIAGNOSIS — I80.02 PHLEBITIS AND THROMBOPHLEBITIS OF SUPERFICIAL VESSELS OF LEFT LOWER EXTREMITY: ICD-10-CM

## 2025-02-04 DIAGNOSIS — Z71.89 OTHER SPECIFIED COUNSELING: ICD-10-CM

## 2025-02-04 DIAGNOSIS — I48.0 PAROXYSMAL ATRIAL FIBRILLATION: ICD-10-CM

## 2025-02-04 PROCEDURE — G2211 COMPLEX E/M VISIT ADD ON: CPT

## 2025-02-04 PROCEDURE — 93000 ELECTROCARDIOGRAM COMPLETE: CPT

## 2025-02-04 PROCEDURE — 99214 OFFICE O/P EST MOD 30 MIN: CPT

## 2025-02-04 RX ORDER — MONTELUKAST 10 MG/1
10 TABLET, FILM COATED ORAL
Refills: 0 | Status: ACTIVE | COMMUNITY

## 2025-04-03 ENCOUNTER — NON-APPOINTMENT (OUTPATIENT)
Age: 75
End: 2025-04-03

## 2025-04-04 ENCOUNTER — APPOINTMENT (OUTPATIENT)
Dept: CARDIOLOGY | Facility: CLINIC | Age: 75
End: 2025-04-04
Payer: MEDICARE

## 2025-04-04 VITALS
WEIGHT: 136 LBS | HEART RATE: 65 BPM | BODY MASS INDEX: 25.03 KG/M2 | HEIGHT: 62 IN | DIASTOLIC BLOOD PRESSURE: 80 MMHG | SYSTOLIC BLOOD PRESSURE: 130 MMHG

## 2025-04-04 VITALS — OXYGEN SATURATION: 99 %

## 2025-04-04 DIAGNOSIS — I30.0 ACUTE NONSPECIFIC IDIOPATHIC PERICARDITIS: ICD-10-CM

## 2025-04-04 LAB
ALBUMIN SERPL ELPH-MCNC: 4.2 G/DL
ALP BLD-CCNC: 73 U/L
ALT SERPL-CCNC: 11 U/L
ANION GAP SERPL CALC-SCNC: 12 MMOL/L
AST SERPL-CCNC: 16 U/L
BILIRUB SERPL-MCNC: 0.5 MG/DL
BUN SERPL-MCNC: 16 MG/DL
CALCIUM SERPL-MCNC: 9.2 MG/DL
CHLORIDE SERPL-SCNC: 104 MMOL/L
CO2 SERPL-SCNC: 24 MMOL/L
CREAT SERPL-MCNC: 0.7 MG/DL
CRP SERPL-MCNC: 69.8 MG/L
EGFRCR SERPLBLD CKD-EPI 2021: 90 ML/MIN/1.73M2
ERYTHROCYTE [SEDIMENTATION RATE] IN BLOOD BY WESTERGREN METHOD: 38 MM/HR
GLUCOSE SERPL-MCNC: 99 MG/DL
HCT VFR BLD CALC: 42.8 %
HGB BLD-MCNC: 13.8 G/DL
MCHC RBC-ENTMCNC: 29.1 PG
MCHC RBC-ENTMCNC: 32.2 G/DL
MCV RBC AUTO: 90.1 FL
NT-PROBNP SERPL-MCNC: 135 PG/ML
PLATELET # BLD AUTO: 198 K/UL
PMV BLD AUTO: 0 /100 WBCS
PMV BLD: 11.4 FL
POTASSIUM SERPL-SCNC: 4.2 MMOL/L
PROT SERPL-MCNC: 6.9 G/DL
RBC # BLD: 4.75 M/UL
RBC # FLD: 13.6 %
SODIUM SERPL-SCNC: 140 MMOL/L
WBC # FLD AUTO: 6.25 K/UL

## 2025-04-04 PROCEDURE — 93000 ELECTROCARDIOGRAM COMPLETE: CPT

## 2025-04-04 PROCEDURE — 93306 TTE W/DOPPLER COMPLETE: CPT

## 2025-04-04 PROCEDURE — 99215 OFFICE O/P EST HI 40 MIN: CPT

## 2025-04-04 RX ORDER — IBUPROFEN 200 MG/1
200 TABLET ORAL 3 TIMES DAILY
Qty: 189 | Refills: 0 | Status: ACTIVE | COMMUNITY
Start: 2025-04-04

## 2025-04-04 RX ORDER — COLCHICINE 0.6 MG/1
0.6 TABLET ORAL TWICE DAILY
Qty: 180 | Refills: 2 | Status: ACTIVE | COMMUNITY
Start: 2025-04-04 | End: 1900-01-01

## 2025-04-10 PROBLEM — I30.0 RECURRENT IDIOPATHIC PERICARDITIS: Status: ACTIVE | Noted: 2025-04-04

## 2025-04-15 ENCOUNTER — APPOINTMENT (OUTPATIENT)
Dept: CARDIOLOGY | Facility: CLINIC | Age: 75
End: 2025-04-15
Payer: MEDICARE

## 2025-04-15 ENCOUNTER — NON-APPOINTMENT (OUTPATIENT)
Age: 75
End: 2025-04-15

## 2025-04-15 VITALS — SYSTOLIC BLOOD PRESSURE: 128 MMHG | DIASTOLIC BLOOD PRESSURE: 74 MMHG

## 2025-04-15 VITALS — HEIGHT: 62 IN | TEMPERATURE: 97.6 F | BODY MASS INDEX: 25.21 KG/M2 | WEIGHT: 137 LBS

## 2025-04-15 DIAGNOSIS — Z71.89 OTHER SPECIFIED COUNSELING: ICD-10-CM

## 2025-04-15 DIAGNOSIS — I30.0 ACUTE NONSPECIFIC IDIOPATHIC PERICARDITIS: ICD-10-CM

## 2025-04-15 DIAGNOSIS — R00.2 PALPITATIONS: ICD-10-CM

## 2025-04-15 DIAGNOSIS — I48.0 PAROXYSMAL ATRIAL FIBRILLATION: ICD-10-CM

## 2025-04-15 PROCEDURE — 99213 OFFICE O/P EST LOW 20 MIN: CPT

## 2025-05-23 ENCOUNTER — RX RENEWAL (OUTPATIENT)
Age: 75
End: 2025-05-23

## 2025-06-01 ENCOUNTER — NON-APPOINTMENT (OUTPATIENT)
Age: 75
End: 2025-06-01

## 2025-07-29 ENCOUNTER — APPOINTMENT (OUTPATIENT)
Dept: CARDIOLOGY | Facility: CLINIC | Age: 75
End: 2025-07-29
Payer: MEDICARE

## 2025-07-29 PROCEDURE — 93971 EXTREMITY STUDY: CPT

## 2025-08-05 ENCOUNTER — APPOINTMENT (OUTPATIENT)
Dept: CARDIOLOGY | Facility: CLINIC | Age: 75
End: 2025-08-05
Payer: MEDICARE

## 2025-08-05 ENCOUNTER — NON-APPOINTMENT (OUTPATIENT)
Age: 75
End: 2025-08-05

## 2025-08-05 VITALS
DIASTOLIC BLOOD PRESSURE: 86 MMHG | HEIGHT: 62 IN | SYSTOLIC BLOOD PRESSURE: 142 MMHG | BODY MASS INDEX: 24.48 KG/M2 | WEIGHT: 133 LBS

## 2025-08-05 DIAGNOSIS — I48.0 PAROXYSMAL ATRIAL FIBRILLATION: ICD-10-CM

## 2025-08-05 DIAGNOSIS — R03.0 ELEVATED BLOOD-PRESSURE READING, W/OUT DIAGNOSIS OF HYPERTENSION: ICD-10-CM

## 2025-08-05 DIAGNOSIS — I80.02 PHLEBITIS AND THROMBOPHLEBITIS OF SUPERFICIAL VESSELS OF LEFT LOWER EXTREMITY: ICD-10-CM

## 2025-08-05 DIAGNOSIS — I30.0 ACUTE NONSPECIFIC IDIOPATHIC PERICARDITIS: ICD-10-CM

## 2025-08-05 PROCEDURE — 99214 OFFICE O/P EST MOD 30 MIN: CPT

## 2025-08-05 PROCEDURE — G2211 COMPLEX E/M VISIT ADD ON: CPT

## 2025-08-05 RX ORDER — TIOTROPIUM BROMIDE INHALATION SPRAY 1.56 UG/1
1.25 SPRAY, METERED RESPIRATORY (INHALATION)
Refills: 0 | Status: ACTIVE | COMMUNITY

## 2025-08-12 LAB
ALBUMIN SERPL ELPH-MCNC: 4.6 G/DL
ALP BLD-CCNC: 72 U/L
ALT SERPL-CCNC: 16 U/L
ANION GAP SERPL CALC-SCNC: 11 MMOL/L
AST SERPL-CCNC: 20 U/L
BILIRUB SERPL-MCNC: 0.6 MG/DL
BUN SERPL-MCNC: 13 MG/DL
CALCIUM SERPL-MCNC: 10 MG/DL
CHLORIDE SERPL-SCNC: 102 MMOL/L
CHOLEST SERPL-MCNC: 209 MG/DL
CO2 SERPL-SCNC: 27 MMOL/L
CREAT SERPL-MCNC: 0.6 MG/DL
CRP SERPL-MCNC: <3 MG/L
EGFRCR SERPLBLD CKD-EPI 2021: 94 ML/MIN/1.73M2
ERYTHROCYTE [SEDIMENTATION RATE] IN BLOOD BY WESTERGREN METHOD: 3 MM/HR
ESTIMATED AVERAGE GLUCOSE: 105 MG/DL
GLUCOSE SERPL-MCNC: 82 MG/DL
HBA1C MFR BLD HPLC: 5.3 %
HCT VFR BLD CALC: 45.8 %
HDLC SERPL-MCNC: 86 MG/DL
HGB BLD-MCNC: 14 G/DL
LDLC SERPL-MCNC: 112 MG/DL
MCHC RBC-ENTMCNC: 28.9 PG
MCHC RBC-ENTMCNC: 30.6 G/DL
MCV RBC AUTO: 94.4 FL
NONHDLC SERPL-MCNC: 123 MG/DL
NT-PROBNP SERPL-MCNC: 110 PG/ML
PLATELET # BLD AUTO: 204 K/UL
PMV BLD AUTO: 0 /100 WBCS
PMV BLD: 11.8 FL
POTASSIUM SERPL-SCNC: 4.3 MMOL/L
PROT SERPL-MCNC: 7 G/DL
RBC # BLD: 4.85 M/UL
RBC # FLD: 15.1 %
SODIUM SERPL-SCNC: 140 MMOL/L
TRIGL SERPL-MCNC: 63 MG/DL
TSH SERPL-ACNC: 1.29 UIU/ML
WBC # FLD AUTO: 6.17 K/UL

## 2025-09-02 ENCOUNTER — NON-APPOINTMENT (OUTPATIENT)
Age: 75
End: 2025-09-02